# Patient Record
Sex: FEMALE | Race: WHITE | NOT HISPANIC OR LATINO | Employment: OTHER | URBAN - NONMETROPOLITAN AREA
[De-identification: names, ages, dates, MRNs, and addresses within clinical notes are randomized per-mention and may not be internally consistent; named-entity substitution may affect disease eponyms.]

---

## 2019-08-19 ENCOUNTER — HOSPITAL ENCOUNTER (OUTPATIENT)
Facility: HOSPITAL | Age: 44
Setting detail: OBSERVATION
Discharge: HOME | End: 2019-08-20
Attending: EMERGENCY MEDICINE | Admitting: INTERNAL MEDICINE

## 2019-08-19 ENCOUNTER — APPOINTMENT (OUTPATIENT)
Dept: GENERAL RADIOLOGY | Facility: HOSPITAL | Age: 44
End: 2019-08-19

## 2019-08-19 ENCOUNTER — APPOINTMENT (OUTPATIENT)
Dept: ULTRASOUND IMAGING | Facility: HOSPITAL | Age: 44
End: 2019-08-19

## 2019-08-19 ENCOUNTER — APPOINTMENT (OUTPATIENT)
Dept: CT IMAGING | Facility: HOSPITAL | Age: 44
End: 2019-08-19

## 2019-08-19 DIAGNOSIS — N17.9 AKI (ACUTE KIDNEY INJURY) (HCC): ICD-10-CM

## 2019-08-19 DIAGNOSIS — R07.9 CHEST PAIN, UNSPECIFIED TYPE: Primary | ICD-10-CM

## 2019-08-19 LAB
ALBUMIN SERPL-MCNC: 3.58 G/DL (ref 3.5–5.2)
ALBUMIN/GLOB SERPL: 0.9 G/DL
ALP SERPL-CCNC: 170 U/L (ref 39–117)
ALT SERPL W P-5'-P-CCNC: 19 U/L (ref 1–33)
ANION GAP SERPL CALCULATED.3IONS-SCNC: 13.8 MMOL/L (ref 5–15)
AST SERPL-CCNC: 18 U/L (ref 1–32)
BASOPHILS # BLD AUTO: 0.03 10*3/MM3 (ref 0–0.2)
BASOPHILS NFR BLD AUTO: 0.3 % (ref 0–1.5)
BILIRUB SERPL-MCNC: 0.3 MG/DL (ref 0.2–1.2)
BILIRUB UR QL STRIP: NEGATIVE
BUN BLD-MCNC: 9 MG/DL (ref 6–20)
BUN/CREAT SERPL: 8.3 (ref 7–25)
CALCIUM SPEC-SCNC: 8.2 MG/DL (ref 8.6–10.5)
CHLORIDE SERPL-SCNC: 97 MMOL/L (ref 98–107)
CLARITY UR: CLEAR
CO2 SERPL-SCNC: 25.2 MMOL/L (ref 22–29)
COLOR UR: YELLOW
CREAT BLD-MCNC: 1.09 MG/DL (ref 0.57–1)
D DIMER PPP FEU-MCNC: 0.78 MCGFEU/ML (ref 0–0.5)
DEPRECATED RDW RBC AUTO: 48.3 FL (ref 37–54)
EOSINOPHIL # BLD AUTO: 0.22 10*3/MM3 (ref 0–0.4)
EOSINOPHIL NFR BLD AUTO: 2.1 % (ref 0.3–6.2)
ERYTHROCYTE [DISTWIDTH] IN BLOOD BY AUTOMATED COUNT: 16.4 % (ref 12.3–15.4)
GFR SERPL CREATININE-BSD FRML MDRD: 55 ML/MIN/1.73
GLOBULIN UR ELPH-MCNC: 3.8 GM/DL
GLUCOSE BLD-MCNC: 110 MG/DL (ref 65–99)
GLUCOSE UR STRIP-MCNC: NEGATIVE MG/DL
HCT VFR BLD AUTO: 34.9 % (ref 34–46.6)
HGB BLD-MCNC: 10.8 G/DL (ref 12–15.9)
HGB UR QL STRIP.AUTO: NEGATIVE
HOLD SPECIMEN: NORMAL
HOLD SPECIMEN: NORMAL
IMM GRANULOCYTES # BLD AUTO: 0.14 10*3/MM3 (ref 0–0.05)
IMM GRANULOCYTES NFR BLD AUTO: 1.4 % (ref 0–0.5)
INR PPP: 0.95 (ref 0.9–1.1)
KETONES UR QL STRIP: NEGATIVE
LEUKOCYTE ESTERASE UR QL STRIP.AUTO: NEGATIVE
LYMPHOCYTES # BLD AUTO: 3.44 10*3/MM3 (ref 0.7–3.1)
LYMPHOCYTES NFR BLD AUTO: 33.4 % (ref 19.6–45.3)
MCH RBC QN AUTO: 25.6 PG (ref 26.6–33)
MCHC RBC AUTO-ENTMCNC: 30.9 G/DL (ref 31.5–35.7)
MCV RBC AUTO: 82.7 FL (ref 79–97)
MONOCYTES # BLD AUTO: 0.53 10*3/MM3 (ref 0.1–0.9)
MONOCYTES NFR BLD AUTO: 5.1 % (ref 5–12)
NEUTROPHILS # BLD AUTO: 5.95 10*3/MM3 (ref 1.7–7)
NEUTROPHILS NFR BLD AUTO: 57.7 % (ref 42.7–76)
NITRITE UR QL STRIP: NEGATIVE
NT-PROBNP SERPL-MCNC: 21.2 PG/ML (ref 5–450)
PH UR STRIP.AUTO: <=5 [PH] (ref 5–8)
PLATELET # BLD AUTO: 476 10*3/MM3 (ref 140–450)
PMV BLD AUTO: 10 FL (ref 6–12)
POTASSIUM BLD-SCNC: 3.1 MMOL/L (ref 3.5–5.2)
PROT SERPL-MCNC: 7.4 G/DL (ref 6–8.5)
PROT UR QL STRIP: NEGATIVE
PROTHROMBIN TIME: 13.2 SECONDS (ref 11–15.4)
RBC # BLD AUTO: 4.22 10*6/MM3 (ref 3.77–5.28)
SODIUM BLD-SCNC: 136 MMOL/L (ref 136–145)
SP GR UR STRIP: 1.01 (ref 1–1.03)
TROPONIN T SERPL-MCNC: <0.01 NG/ML (ref 0–0.03)
TSH SERPL DL<=0.05 MIU/L-ACNC: 3.55 MIU/ML (ref 0.27–4.2)
UROBILINOGEN UR QL STRIP: NORMAL
WBC NRBC COR # BLD: 10.31 10*3/MM3 (ref 3.4–10.8)
WHOLE BLOOD HOLD SPECIMEN: NORMAL
WHOLE BLOOD HOLD SPECIMEN: NORMAL

## 2019-08-19 PROCEDURE — G0378 HOSPITAL OBSERVATION PER HR: HCPCS

## 2019-08-19 PROCEDURE — 25010000002 HEPARIN (PORCINE) PER 1000 UNITS: Performed by: PHYSICIAN ASSISTANT

## 2019-08-19 PROCEDURE — 80053 COMPREHEN METABOLIC PANEL: CPT | Performed by: PHYSICIAN ASSISTANT

## 2019-08-19 PROCEDURE — 93010 ELECTROCARDIOGRAM REPORT: CPT | Performed by: INTERNAL MEDICINE

## 2019-08-19 PROCEDURE — 93970 EXTREMITY STUDY: CPT

## 2019-08-19 PROCEDURE — 93005 ELECTROCARDIOGRAM TRACING: CPT | Performed by: EMERGENCY MEDICINE

## 2019-08-19 PROCEDURE — 71275 CT ANGIOGRAPHY CHEST: CPT

## 2019-08-19 PROCEDURE — 84443 ASSAY THYROID STIM HORMONE: CPT | Performed by: PHYSICIAN ASSISTANT

## 2019-08-19 PROCEDURE — 84484 ASSAY OF TROPONIN QUANT: CPT | Performed by: PHYSICIAN ASSISTANT

## 2019-08-19 PROCEDURE — 71045 X-RAY EXAM CHEST 1 VIEW: CPT

## 2019-08-19 PROCEDURE — 71275 CT ANGIOGRAPHY CHEST: CPT | Performed by: RADIOLOGY

## 2019-08-19 PROCEDURE — 71045 X-RAY EXAM CHEST 1 VIEW: CPT | Performed by: RADIOLOGY

## 2019-08-19 PROCEDURE — 85025 COMPLETE CBC W/AUTO DIFF WBC: CPT | Performed by: PHYSICIAN ASSISTANT

## 2019-08-19 PROCEDURE — 81003 URINALYSIS AUTO W/O SCOPE: CPT | Performed by: PHYSICIAN ASSISTANT

## 2019-08-19 PROCEDURE — 93970 EXTREMITY STUDY: CPT | Performed by: RADIOLOGY

## 2019-08-19 PROCEDURE — 96372 THER/PROPH/DIAG INJ SC/IM: CPT

## 2019-08-19 PROCEDURE — 85379 FIBRIN DEGRADATION QUANT: CPT | Performed by: PHYSICIAN ASSISTANT

## 2019-08-19 PROCEDURE — 93005 ELECTROCARDIOGRAM TRACING: CPT | Performed by: PHYSICIAN ASSISTANT

## 2019-08-19 PROCEDURE — 99285 EMERGENCY DEPT VISIT HI MDM: CPT

## 2019-08-19 PROCEDURE — 0 IOVERSOL 74 % SOLUTION: Performed by: EMERGENCY MEDICINE

## 2019-08-19 PROCEDURE — 85610 PROTHROMBIN TIME: CPT | Performed by: PHYSICIAN ASSISTANT

## 2019-08-19 PROCEDURE — 83880 ASSAY OF NATRIURETIC PEPTIDE: CPT | Performed by: PHYSICIAN ASSISTANT

## 2019-08-19 RX ORDER — CLONIDINE HYDROCHLORIDE 0.1 MG/1
0.1 TABLET ORAL 2 TIMES DAILY
Status: ON HOLD | COMMUNITY
End: 2020-07-05

## 2019-08-19 RX ORDER — DOXEPIN HYDROCHLORIDE 10 MG/1
10 CAPSULE ORAL NIGHTLY
COMMUNITY
End: 2019-08-19

## 2019-08-19 RX ORDER — FUROSEMIDE 40 MG/1
40 TABLET ORAL DAILY
COMMUNITY
End: 2019-08-20 | Stop reason: HOSPADM

## 2019-08-19 RX ORDER — ACETAMINOPHEN 325 MG/1
650 TABLET ORAL EVERY 4 HOURS PRN
Status: DISCONTINUED | OUTPATIENT
Start: 2019-08-19 | End: 2019-08-20 | Stop reason: HOSPADM

## 2019-08-19 RX ORDER — ONDANSETRON 2 MG/ML
4 INJECTION INTRAMUSCULAR; INTRAVENOUS EVERY 6 HOURS PRN
Status: DISCONTINUED | OUTPATIENT
Start: 2019-08-19 | End: 2019-08-20 | Stop reason: HOSPADM

## 2019-08-19 RX ORDER — BUPROPION HYDROCHLORIDE 150 MG/1
150 TABLET, EXTENDED RELEASE ORAL 2 TIMES DAILY
Status: ON HOLD | COMMUNITY
End: 2020-07-05

## 2019-08-19 RX ORDER — NITROGLYCERIN 0.4 MG/1
0.4 TABLET SUBLINGUAL
Status: DISCONTINUED | OUTPATIENT
Start: 2019-08-19 | End: 2019-08-20 | Stop reason: HOSPADM

## 2019-08-19 RX ORDER — POTASSIUM CHLORIDE 20 MEQ/1
40 TABLET, EXTENDED RELEASE ORAL EVERY 4 HOURS
Status: COMPLETED | OUTPATIENT
Start: 2019-08-19 | End: 2019-08-20

## 2019-08-19 RX ORDER — POTASSIUM CHLORIDE 1.5 G/1.77G
40 POWDER, FOR SOLUTION ORAL AS NEEDED
Status: DISCONTINUED | OUTPATIENT
Start: 2019-08-19 | End: 2019-08-20 | Stop reason: HOSPADM

## 2019-08-19 RX ORDER — ACETAMINOPHEN 325 MG/1
650 TABLET ORAL ONCE
Status: COMPLETED | OUTPATIENT
Start: 2019-08-19 | End: 2019-08-19

## 2019-08-19 RX ORDER — HEPARIN SODIUM 5000 [USP'U]/ML
5000 INJECTION, SOLUTION INTRAVENOUS; SUBCUTANEOUS EVERY 12 HOURS SCHEDULED
Status: DISCONTINUED | OUTPATIENT
Start: 2019-08-19 | End: 2019-08-20 | Stop reason: HOSPADM

## 2019-08-19 RX ORDER — LOSARTAN POTASSIUM 50 MG/1
100 TABLET ORAL DAILY
Status: DISCONTINUED | OUTPATIENT
Start: 2019-08-20 | End: 2019-08-20 | Stop reason: HOSPADM

## 2019-08-19 RX ORDER — TIZANIDINE 4 MG/1
4 TABLET ORAL EVERY 8 HOURS PRN
Status: ON HOLD | COMMUNITY
End: 2020-07-05

## 2019-08-19 RX ORDER — FLUOXETINE HYDROCHLORIDE 20 MG/1
60 CAPSULE ORAL DAILY
Status: ON HOLD | COMMUNITY
End: 2020-07-05

## 2019-08-19 RX ORDER — BUPRENORPHINE HYDROCHLORIDE AND NALOXONE HYDROCHLORIDE DIHYDRATE 8; 2 MG/1; MG/1
2 TABLET SUBLINGUAL DAILY
Status: DISCONTINUED | OUTPATIENT
Start: 2019-08-20 | End: 2019-08-20 | Stop reason: HOSPADM

## 2019-08-19 RX ORDER — ZOLPIDEM TARTRATE 5 MG/1
5 TABLET ORAL NIGHTLY PRN
Status: DISCONTINUED | OUTPATIENT
Start: 2019-08-19 | End: 2019-08-20 | Stop reason: HOSPADM

## 2019-08-19 RX ORDER — PRAZOSIN HYDROCHLORIDE 5 MG/1
5 CAPSULE ORAL NIGHTLY
Status: ON HOLD | COMMUNITY
End: 2020-07-05

## 2019-08-19 RX ORDER — AMITRIPTYLINE HYDROCHLORIDE 25 MG/1
25 TABLET, FILM COATED ORAL NIGHTLY
COMMUNITY
End: 2019-08-19

## 2019-08-19 RX ORDER — PANTOPRAZOLE SODIUM 40 MG/1
40 TABLET, DELAYED RELEASE ORAL EVERY MORNING
Status: DISCONTINUED | OUTPATIENT
Start: 2019-08-20 | End: 2019-08-20 | Stop reason: HOSPADM

## 2019-08-19 RX ORDER — AMITRIPTYLINE HYDROCHLORIDE 75 MG/1
75 TABLET, FILM COATED ORAL NIGHTLY
Status: ON HOLD | COMMUNITY
End: 2020-07-05

## 2019-08-19 RX ORDER — SPIRONOLACTONE 25 MG/1
25 TABLET ORAL DAILY
COMMUNITY
End: 2019-08-20 | Stop reason: HOSPADM

## 2019-08-19 RX ORDER — ACETAMINOPHEN 650 MG/1
650 SUPPOSITORY RECTAL EVERY 4 HOURS PRN
Status: DISCONTINUED | OUTPATIENT
Start: 2019-08-19 | End: 2019-08-20 | Stop reason: HOSPADM

## 2019-08-19 RX ORDER — BUPRENORPHINE AND NALOXONE 8; 2 MG/1; MG/1
2 FILM, SOLUBLE BUCCAL; SUBLINGUAL DAILY
COMMUNITY

## 2019-08-19 RX ORDER — OMEPRAZOLE 20 MG/1
20 CAPSULE, DELAYED RELEASE ORAL DAILY
Status: ON HOLD | COMMUNITY
End: 2020-07-05

## 2019-08-19 RX ORDER — ACETAMINOPHEN 160 MG/5ML
650 SOLUTION ORAL EVERY 4 HOURS PRN
Status: DISCONTINUED | OUTPATIENT
Start: 2019-08-19 | End: 2019-08-20 | Stop reason: HOSPADM

## 2019-08-19 RX ORDER — FUROSEMIDE 40 MG/1
40 TABLET ORAL DAILY
Status: DISCONTINUED | OUTPATIENT
Start: 2019-08-20 | End: 2019-08-20

## 2019-08-19 RX ORDER — ROPINIROLE 2 MG/1
3 TABLET, FILM COATED ORAL 2 TIMES DAILY
COMMUNITY
End: 2019-08-19

## 2019-08-19 RX ORDER — ZOLPIDEM TARTRATE 10 MG/1
10 TABLET ORAL NIGHTLY PRN
COMMUNITY

## 2019-08-19 RX ORDER — IBUPROFEN 800 MG/1
800 TABLET ORAL EVERY 6 HOURS PRN
COMMUNITY
End: 2019-08-19

## 2019-08-19 RX ORDER — SODIUM CHLORIDE 0.9 % (FLUSH) 0.9 %
10 SYRINGE (ML) INJECTION AS NEEDED
Status: DISCONTINUED | OUTPATIENT
Start: 2019-08-19 | End: 2019-08-20 | Stop reason: HOSPADM

## 2019-08-19 RX ORDER — LOSARTAN POTASSIUM 100 MG/1
100 TABLET ORAL DAILY
Status: ON HOLD | COMMUNITY
End: 2020-07-05

## 2019-08-19 RX ORDER — POTASSIUM CHLORIDE 20 MEQ/1
40 TABLET, EXTENDED RELEASE ORAL ONCE
Status: COMPLETED | OUTPATIENT
Start: 2019-08-19 | End: 2019-08-19

## 2019-08-19 RX ORDER — FLUOXETINE HYDROCHLORIDE 20 MG/1
60 CAPSULE ORAL DAILY
Status: DISCONTINUED | OUTPATIENT
Start: 2019-08-20 | End: 2019-08-20 | Stop reason: HOSPADM

## 2019-08-19 RX ORDER — ONDANSETRON 4 MG/1
4 TABLET, FILM COATED ORAL EVERY 6 HOURS PRN
Status: DISCONTINUED | OUTPATIENT
Start: 2019-08-19 | End: 2019-08-20 | Stop reason: HOSPADM

## 2019-08-19 RX ORDER — SODIUM CHLORIDE 0.9 % (FLUSH) 0.9 %
3-10 SYRINGE (ML) INJECTION AS NEEDED
Status: DISCONTINUED | OUTPATIENT
Start: 2019-08-19 | End: 2019-08-20 | Stop reason: HOSPADM

## 2019-08-19 RX ORDER — ASPIRIN 81 MG/1
324 TABLET, CHEWABLE ORAL ONCE
Status: COMPLETED | OUTPATIENT
Start: 2019-08-19 | End: 2019-08-19

## 2019-08-19 RX ORDER — CHLORTHALIDONE 25 MG/1
25 TABLET ORAL DAILY
Status: ON HOLD | COMMUNITY
End: 2020-07-05

## 2019-08-19 RX ORDER — CHLORTHALIDONE 50 MG/1
25 TABLET ORAL DAILY
Status: DISCONTINUED | OUTPATIENT
Start: 2019-08-20 | End: 2019-08-20 | Stop reason: HOSPADM

## 2019-08-19 RX ORDER — TIZANIDINE 4 MG/1
4 TABLET ORAL EVERY 8 HOURS PRN
Status: DISCONTINUED | OUTPATIENT
Start: 2019-08-19 | End: 2019-08-20 | Stop reason: HOSPADM

## 2019-08-19 RX ORDER — ROPINIROLE 1 MG/1
3 TABLET, FILM COATED ORAL 2 TIMES DAILY PRN
Status: DISCONTINUED | OUTPATIENT
Start: 2019-08-19 | End: 2019-08-20 | Stop reason: HOSPADM

## 2019-08-19 RX ORDER — POTASSIUM CHLORIDE 20 MEQ/1
40 TABLET, EXTENDED RELEASE ORAL AS NEEDED
Status: DISCONTINUED | OUTPATIENT
Start: 2019-08-19 | End: 2019-08-20 | Stop reason: HOSPADM

## 2019-08-19 RX ORDER — POTASSIUM CHLORIDE 7.45 MG/ML
10 INJECTION INTRAVENOUS
Status: DISCONTINUED | OUTPATIENT
Start: 2019-08-19 | End: 2019-08-20 | Stop reason: HOSPADM

## 2019-08-19 RX ORDER — BUPRENORPHINE HYDROCHLORIDE AND NALOXONE HYDROCHLORIDE DIHYDRATE 8; 2 MG/1; MG/1
1 TABLET SUBLINGUAL DAILY
COMMUNITY
End: 2019-08-19

## 2019-08-19 RX ORDER — LOSARTAN POTASSIUM 50 MG/1
100 TABLET ORAL DAILY
COMMUNITY
End: 2019-08-19

## 2019-08-19 RX ORDER — ROPINIROLE 3 MG/1
3 TABLET, FILM COATED ORAL 3 TIMES DAILY
COMMUNITY

## 2019-08-19 RX ORDER — SODIUM CHLORIDE 0.9 % (FLUSH) 0.9 %
3 SYRINGE (ML) INJECTION EVERY 12 HOURS SCHEDULED
Status: DISCONTINUED | OUTPATIENT
Start: 2019-08-19 | End: 2019-08-20 | Stop reason: HOSPADM

## 2019-08-19 RX ORDER — SPIRONOLACTONE 25 MG/1
25 TABLET ORAL DAILY
Status: DISCONTINUED | OUTPATIENT
Start: 2019-08-20 | End: 2019-08-20 | Stop reason: HOSPADM

## 2019-08-19 RX ORDER — BUPROPION HYDROCHLORIDE 150 MG/1
150 TABLET, EXTENDED RELEASE ORAL 2 TIMES DAILY
COMMUNITY
End: 2019-08-19

## 2019-08-19 RX ORDER — CLONIDINE HYDROCHLORIDE 0.1 MG/1
0.1 TABLET ORAL 2 TIMES DAILY
Status: DISCONTINUED | OUTPATIENT
Start: 2019-08-19 | End: 2019-08-20 | Stop reason: HOSPADM

## 2019-08-19 RX ORDER — PRAZOSIN HYDROCHLORIDE 5 MG/1
5 CAPSULE ORAL NIGHTLY
Status: DISCONTINUED | OUTPATIENT
Start: 2019-08-19 | End: 2019-08-20 | Stop reason: HOSPADM

## 2019-08-19 RX ORDER — BUPROPION HYDROCHLORIDE 150 MG/1
150 TABLET, EXTENDED RELEASE ORAL 2 TIMES DAILY
Status: DISCONTINUED | OUTPATIENT
Start: 2019-08-19 | End: 2019-08-20 | Stop reason: HOSPADM

## 2019-08-19 RX ADMIN — IOVERSOL 100 ML: 741 INJECTION INTRA-ARTERIAL; INTRAVENOUS at 17:16

## 2019-08-19 RX ADMIN — ROPINIROLE HYDROCHLORIDE 3 MG: 1 TABLET, FILM COATED ORAL at 23:48

## 2019-08-19 RX ADMIN — AMITRIPTYLINE HYDROCHLORIDE 75 MG: 50 TABLET, FILM COATED ORAL at 22:08

## 2019-08-19 RX ADMIN — ACETAMINOPHEN 650 MG: 325 TABLET ORAL at 15:28

## 2019-08-19 RX ADMIN — NITROGLYCERIN 1 INCH: 20 OINTMENT TOPICAL at 12:45

## 2019-08-19 RX ADMIN — PRAZOSIN HYDROCHLORIDE 5 MG: 5 CAPSULE ORAL at 22:08

## 2019-08-19 RX ADMIN — TIZANIDINE 4 MG: 4 TABLET ORAL at 23:48

## 2019-08-19 RX ADMIN — ASPIRIN 324 MG: 81 TABLET, CHEWABLE ORAL at 12:44

## 2019-08-19 RX ADMIN — ZOLPIDEM TARTRATE 5 MG: 5 TABLET ORAL at 22:11

## 2019-08-19 RX ADMIN — POTASSIUM CHLORIDE 40 MEQ: 1500 TABLET, EXTENDED RELEASE ORAL at 22:07

## 2019-08-19 RX ADMIN — HEPARIN SODIUM 5000 UNITS: 5000 INJECTION INTRAVENOUS; SUBCUTANEOUS at 23:48

## 2019-08-19 RX ADMIN — POTASSIUM CHLORIDE 40 MEQ: 1500 TABLET, EXTENDED RELEASE ORAL at 15:30

## 2019-08-20 ENCOUNTER — APPOINTMENT (OUTPATIENT)
Dept: CARDIOLOGY | Facility: HOSPITAL | Age: 44
End: 2019-08-20

## 2019-08-20 ENCOUNTER — APPOINTMENT (OUTPATIENT)
Dept: NUCLEAR MEDICINE | Facility: HOSPITAL | Age: 44
End: 2019-08-20

## 2019-08-20 VITALS
OXYGEN SATURATION: 93 % | HEART RATE: 91 BPM | SYSTOLIC BLOOD PRESSURE: 129 MMHG | WEIGHT: 293 LBS | TEMPERATURE: 98 F | HEIGHT: 63 IN | DIASTOLIC BLOOD PRESSURE: 85 MMHG | RESPIRATION RATE: 18 BRPM | BODY MASS INDEX: 51.91 KG/M2

## 2019-08-20 PROBLEM — R07.9 CHEST PAIN: Status: RESOLVED | Noted: 2019-08-19 | Resolved: 2019-08-20

## 2019-08-20 LAB
ALBUMIN SERPL-MCNC: 3.22 G/DL (ref 3.5–5.2)
ALBUMIN/GLOB SERPL: 1 G/DL
ALP SERPL-CCNC: 161 U/L (ref 39–117)
ALT SERPL W P-5'-P-CCNC: 16 U/L (ref 1–33)
ANION GAP SERPL CALCULATED.3IONS-SCNC: 12.9 MMOL/L (ref 5–15)
ANISOCYTOSIS BLD QL: NORMAL
AST SERPL-CCNC: 17 U/L (ref 1–32)
BH CV NUCLEAR PRIOR STUDY: 3
BH CV STRESS BP STAGE 1: NORMAL
BH CV STRESS BP STAGE 2: NORMAL
BH CV STRESS COMMENTS STAGE 1: NORMAL
BH CV STRESS COMMENTS STAGE 2: NORMAL
BH CV STRESS DOSE REGADENOSON STAGE 1: 0.4
BH CV STRESS DURATION MIN STAGE 1: 0
BH CV STRESS DURATION MIN STAGE 2: 4
BH CV STRESS DURATION SEC STAGE 1: 10
BH CV STRESS DURATION SEC STAGE 2: 0
BH CV STRESS HR STAGE 1: 98
BH CV STRESS HR STAGE 2: 97
BH CV STRESS PROTOCOL 1: NORMAL
BH CV STRESS RECOVERY BP: NORMAL MMHG
BH CV STRESS RECOVERY HR: 94 BPM
BH CV STRESS STAGE 1: 1
BH CV STRESS STAGE 2: 2
BILIRUB SERPL-MCNC: 0.3 MG/DL (ref 0.2–1.2)
BUN BLD-MCNC: 13 MG/DL (ref 6–20)
BUN/CREAT SERPL: 9.9 (ref 7–25)
CALCIUM SPEC-SCNC: 7.8 MG/DL (ref 8.6–10.5)
CHLORIDE SERPL-SCNC: 96 MMOL/L (ref 98–107)
CHOLEST SERPL-MCNC: 198 MG/DL (ref 0–200)
CO2 SERPL-SCNC: 25.1 MMOL/L (ref 22–29)
CREAT BLD-MCNC: 1.31 MG/DL (ref 0.57–1)
DEPRECATED RDW RBC AUTO: 51.3 FL (ref 37–54)
EOSINOPHIL # BLD MANUAL: 0.09 10*3/MM3 (ref 0–0.4)
EOSINOPHIL NFR BLD MANUAL: 1 % (ref 0.3–6.2)
ERYTHROCYTE [DISTWIDTH] IN BLOOD BY AUTOMATED COUNT: 16.5 % (ref 12.3–15.4)
GFR SERPL CREATININE-BSD FRML MDRD: 44 ML/MIN/1.73
GLOBULIN UR ELPH-MCNC: 3.4 GM/DL
GLUCOSE BLD-MCNC: 211 MG/DL (ref 65–99)
HCT VFR BLD AUTO: 34.5 % (ref 34–46.6)
HDLC SERPL-MCNC: 33 MG/DL (ref 40–60)
HGB BLD-MCNC: 10.2 G/DL (ref 12–15.9)
HYPOCHROMIA BLD QL: NORMAL
LDLC SERPL CALC-MCNC: 103 MG/DL (ref 0–100)
LDLC/HDLC SERPL: 3.12 {RATIO}
LIPASE SERPL-CCNC: 8 U/L (ref 13–60)
LV EF NUC BP: 57 %
LYMPHOCYTES # BLD MANUAL: 2.02 10*3/MM3 (ref 0.7–3.1)
LYMPHOCYTES NFR BLD MANUAL: 22 % (ref 19.6–45.3)
LYMPHOCYTES NFR BLD MANUAL: 8 % (ref 5–12)
MAXIMAL PREDICTED HEART RATE: 177 BPM
MCH RBC QN AUTO: 25.1 PG (ref 26.6–33)
MCHC RBC AUTO-ENTMCNC: 29.6 G/DL (ref 31.5–35.7)
MCV RBC AUTO: 84.8 FL (ref 79–97)
MONOCYTES # BLD AUTO: 0.74 10*3/MM3 (ref 0.1–0.9)
NEUTROPHILS # BLD AUTO: 6.35 10*3/MM3 (ref 1.7–7)
NEUTROPHILS NFR BLD MANUAL: 69 % (ref 42.7–76)
NT-PROBNP SERPL-MCNC: <5 PG/ML (ref 5–450)
PERCENT MAX PREDICTED HR: 55.37 %
PLAT MORPH BLD: NORMAL
PLATELET # BLD AUTO: 357 10*3/MM3 (ref 140–450)
PMV BLD AUTO: 9.6 FL (ref 6–12)
POTASSIUM BLD-SCNC: 3.4 MMOL/L (ref 3.5–5.2)
POTASSIUM BLD-SCNC: 3.8 MMOL/L (ref 3.5–5.2)
PROT SERPL-MCNC: 6.6 G/DL (ref 6–8.5)
RBC # BLD AUTO: 4.07 10*6/MM3 (ref 3.77–5.28)
SODIUM BLD-SCNC: 134 MMOL/L (ref 136–145)
STRESS BASELINE BP: NORMAL MMHG
STRESS BASELINE HR: 85 BPM
STRESS PERCENT HR: 65 %
STRESS POST PEAK BP: NORMAL MMHG
STRESS POST PEAK HR: 98 BPM
STRESS TARGET HR: 150 BPM
TRIGL SERPL-MCNC: 310 MG/DL (ref 0–150)
TROPONIN T SERPL-MCNC: <0.01 NG/ML (ref 0–0.03)
VLDLC SERPL-MCNC: 62 MG/DL
WBC NRBC COR # BLD: 9.2 10*3/MM3 (ref 3.4–10.8)

## 2019-08-20 PROCEDURE — G0378 HOSPITAL OBSERVATION PER HR: HCPCS

## 2019-08-20 PROCEDURE — 85007 BL SMEAR W/DIFF WBC COUNT: CPT | Performed by: PHYSICIAN ASSISTANT

## 2019-08-20 PROCEDURE — 93010 ELECTROCARDIOGRAM REPORT: CPT | Performed by: INTERNAL MEDICINE

## 2019-08-20 PROCEDURE — 84132 ASSAY OF SERUM POTASSIUM: CPT | Performed by: INTERNAL MEDICINE

## 2019-08-20 PROCEDURE — 93005 ELECTROCARDIOGRAM TRACING: CPT | Performed by: INTERNAL MEDICINE

## 2019-08-20 PROCEDURE — 96360 HYDRATION IV INFUSION INIT: CPT

## 2019-08-20 PROCEDURE — 96372 THER/PROPH/DIAG INJ SC/IM: CPT

## 2019-08-20 PROCEDURE — 78452 HT MUSCLE IMAGE SPECT MULT: CPT

## 2019-08-20 PROCEDURE — A9500 TC99M SESTAMIBI: HCPCS | Performed by: INTERNAL MEDICINE

## 2019-08-20 PROCEDURE — G0108 DIAB MANAGE TRN  PER INDIV: HCPCS

## 2019-08-20 PROCEDURE — 83880 ASSAY OF NATRIURETIC PEPTIDE: CPT | Performed by: PHYSICIAN ASSISTANT

## 2019-08-20 PROCEDURE — 83690 ASSAY OF LIPASE: CPT | Performed by: PHYSICIAN ASSISTANT

## 2019-08-20 PROCEDURE — 93005 ELECTROCARDIOGRAM TRACING: CPT | Performed by: PHYSICIAN ASSISTANT

## 2019-08-20 PROCEDURE — 93018 CV STRESS TEST I&R ONLY: CPT | Performed by: INTERNAL MEDICINE

## 2019-08-20 PROCEDURE — 0 TECHNETIUM SESTAMIBI: Performed by: INTERNAL MEDICINE

## 2019-08-20 PROCEDURE — 93017 CV STRESS TEST TRACING ONLY: CPT

## 2019-08-20 PROCEDURE — 85027 COMPLETE CBC AUTOMATED: CPT | Performed by: PHYSICIAN ASSISTANT

## 2019-08-20 PROCEDURE — 78452 HT MUSCLE IMAGE SPECT MULT: CPT | Performed by: INTERNAL MEDICINE

## 2019-08-20 PROCEDURE — 99204 OFFICE O/P NEW MOD 45 MIN: CPT | Performed by: INTERNAL MEDICINE

## 2019-08-20 PROCEDURE — 80061 LIPID PANEL: CPT | Performed by: PHYSICIAN ASSISTANT

## 2019-08-20 PROCEDURE — 25010000002 REGADENOSON 0.4 MG/5ML SOLUTION: Performed by: INTERNAL MEDICINE

## 2019-08-20 PROCEDURE — 80053 COMPREHEN METABOLIC PANEL: CPT | Performed by: PHYSICIAN ASSISTANT

## 2019-08-20 PROCEDURE — 96361 HYDRATE IV INFUSION ADD-ON: CPT

## 2019-08-20 PROCEDURE — 25010000002 HEPARIN (PORCINE) PER 1000 UNITS: Performed by: PHYSICIAN ASSISTANT

## 2019-08-20 PROCEDURE — 84484 ASSAY OF TROPONIN QUANT: CPT | Performed by: PHYSICIAN ASSISTANT

## 2019-08-20 RX ORDER — ATORVASTATIN CALCIUM 40 MG/1
40 TABLET, FILM COATED ORAL NIGHTLY
Status: DISCONTINUED | OUTPATIENT
Start: 2019-08-20 | End: 2019-08-20 | Stop reason: HOSPADM

## 2019-08-20 RX ORDER — SODIUM CHLORIDE 9 MG/ML
100 INJECTION, SOLUTION INTRAVENOUS CONTINUOUS
Status: DISCONTINUED | OUTPATIENT
Start: 2019-08-20 | End: 2019-08-20 | Stop reason: HOSPADM

## 2019-08-20 RX ORDER — ASPIRIN 81 MG/1
81 TABLET ORAL DAILY
Status: DISCONTINUED | OUTPATIENT
Start: 2019-08-20 | End: 2019-08-20 | Stop reason: HOSPADM

## 2019-08-20 RX ORDER — ASPIRIN 81 MG/1
81 TABLET ORAL DAILY
Qty: 30 TABLET | Refills: 0 | Status: ON HOLD | OUTPATIENT
Start: 2019-08-21 | End: 2020-07-05

## 2019-08-20 RX ORDER — FLUTICASONE PROPIONATE 50 MCG
2 SPRAY, SUSPENSION (ML) NASAL DAILY
Status: DISCONTINUED | OUTPATIENT
Start: 2019-08-20 | End: 2019-08-20 | Stop reason: HOSPADM

## 2019-08-20 RX ORDER — CETIRIZINE HYDROCHLORIDE 10 MG/1
10 TABLET ORAL DAILY
Status: DISCONTINUED | OUTPATIENT
Start: 2019-08-20 | End: 2019-08-20 | Stop reason: HOSPADM

## 2019-08-20 RX ORDER — ATORVASTATIN CALCIUM 20 MG/1
20 TABLET, FILM COATED ORAL NIGHTLY
Status: DISCONTINUED | OUTPATIENT
Start: 2019-08-20 | End: 2019-08-20

## 2019-08-20 RX ORDER — ATORVASTATIN CALCIUM 40 MG/1
40 TABLET, FILM COATED ORAL NIGHTLY
Qty: 30 TABLET | Refills: 0 | Status: ON HOLD | OUTPATIENT
Start: 2019-08-20 | End: 2020-07-05

## 2019-08-20 RX ADMIN — ASPIRIN 81 MG: 81 TABLET, COATED ORAL at 13:31

## 2019-08-20 RX ADMIN — FUROSEMIDE 40 MG: 40 TABLET ORAL at 08:16

## 2019-08-20 RX ADMIN — LOSARTAN POTASSIUM 100 MG: 50 TABLET, FILM COATED ORAL at 08:15

## 2019-08-20 RX ADMIN — POTASSIUM CHLORIDE 40 MEQ: 1500 TABLET, EXTENDED RELEASE ORAL at 04:53

## 2019-08-20 RX ADMIN — ACETAMINOPHEN 650 MG: 325 TABLET ORAL at 11:06

## 2019-08-20 RX ADMIN — SODIUM CHLORIDE, PRESERVATIVE FREE 3 ML: 5 INJECTION INTRAVENOUS at 08:17

## 2019-08-20 RX ADMIN — TECHNETIUM TC 99M SESTAMIBI 1 DOSE: 1 INJECTION INTRAVENOUS at 14:46

## 2019-08-20 RX ADMIN — REGADENOSON 0.4 MG: 0.08 INJECTION, SOLUTION INTRAVENOUS at 14:46

## 2019-08-20 RX ADMIN — FLUTICASONE PROPIONATE 2 SPRAY: 50 SPRAY, METERED NASAL at 10:06

## 2019-08-20 RX ADMIN — SODIUM CHLORIDE 100 ML/HR: 9 INJECTION, SOLUTION INTRAVENOUS at 10:57

## 2019-08-20 RX ADMIN — CLONIDINE HYDROCHLORIDE 0.1 MG: 0.1 TABLET ORAL at 08:15

## 2019-08-20 RX ADMIN — CHLORTHALIDONE 25 MG: 50 TABLET ORAL at 08:16

## 2019-08-20 RX ADMIN — CETIRIZINE HYDROCHLORIDE 10 MG: 10 TABLET, FILM COATED ORAL at 10:06

## 2019-08-20 RX ADMIN — SPIRONOLACTONE 25 MG: 25 TABLET ORAL at 08:16

## 2019-08-20 RX ADMIN — BUPRENORPHINE AND NALOXONE 2 TABLET: 8; 2 TABLET SUBLINGUAL at 08:16

## 2019-08-20 RX ADMIN — FLUOXETINE 60 MG: 20 CAPSULE ORAL at 08:15

## 2019-08-20 RX ADMIN — TECHNETIUM TC 99M SESTAMIBI 1 DOSE: 1 INJECTION INTRAVENOUS at 11:55

## 2019-08-20 RX ADMIN — HEPARIN SODIUM 5000 UNITS: 5000 INJECTION INTRAVENOUS; SUBCUTANEOUS at 08:16

## 2019-08-20 RX ADMIN — POTASSIUM CHLORIDE 40 MEQ: 1500 TABLET, EXTENDED RELEASE ORAL at 02:02

## 2020-07-04 ENCOUNTER — APPOINTMENT (OUTPATIENT)
Dept: ULTRASOUND IMAGING | Facility: HOSPITAL | Age: 45
End: 2020-07-04

## 2020-07-04 ENCOUNTER — HOSPITAL ENCOUNTER (INPATIENT)
Facility: HOSPITAL | Age: 45
LOS: 1 days | Discharge: HOME | End: 2020-07-05
Attending: EMERGENCY MEDICINE | Admitting: INTERNAL MEDICINE

## 2020-07-04 ENCOUNTER — APPOINTMENT (OUTPATIENT)
Dept: GENERAL RADIOLOGY | Facility: HOSPITAL | Age: 45
End: 2020-07-04

## 2020-07-04 ENCOUNTER — APPOINTMENT (OUTPATIENT)
Dept: CT IMAGING | Facility: HOSPITAL | Age: 45
End: 2020-07-04

## 2020-07-04 DIAGNOSIS — L03.119 CELLULITIS OF LOWER EXTREMITY, UNSPECIFIED LATERALITY: Primary | ICD-10-CM

## 2020-07-04 PROBLEM — K21.9 GERD (GASTROESOPHAGEAL REFLUX DISEASE): Chronic | Status: ACTIVE | Noted: 2020-07-04

## 2020-07-04 PROBLEM — F41.9 ANXIETY DISORDER: Chronic | Status: ACTIVE | Noted: 2020-07-04

## 2020-07-04 PROBLEM — G93.2 PSEUDOTUMOR CEREBRI: Chronic | Status: ACTIVE | Noted: 2020-07-04

## 2020-07-04 PROBLEM — R60.9 EDEMA: Status: ACTIVE | Noted: 2020-07-04

## 2020-07-04 PROBLEM — I10 ESSENTIAL HYPERTENSION: Chronic | Status: ACTIVE | Noted: 2020-07-04

## 2020-07-04 PROBLEM — G25.81 RESTLESS LEG: Chronic | Status: ACTIVE | Noted: 2020-07-04

## 2020-07-04 PROBLEM — I50.9 CHF (CONGESTIVE HEART FAILURE) (HCC): Chronic | Status: ACTIVE | Noted: 2020-07-04

## 2020-07-04 PROBLEM — E78.5 HYPERLIPIDEMIA: Chronic | Status: ACTIVE | Noted: 2020-07-04

## 2020-07-04 PROBLEM — E66.01 MORBID OBESITY: Chronic | Status: ACTIVE | Noted: 2020-07-04

## 2020-07-04 LAB
A-A DO2: 63.2 MMHG (ref 0–300)
ALBUMIN SERPL-MCNC: 4.07 G/DL (ref 3.5–5.2)
ALBUMIN/GLOB SERPL: 1.1 G/DL
ALP SERPL-CCNC: 175 U/L (ref 39–117)
ALT SERPL W P-5'-P-CCNC: 18 U/L (ref 1–33)
AMYLASE SERPL-CCNC: 20 U/L (ref 28–100)
ANION GAP SERPL CALCULATED.3IONS-SCNC: 16.3 MMOL/L (ref 5–15)
ARTERIAL PATENCY WRIST A: POSITIVE
AST SERPL-CCNC: 15 U/L (ref 1–32)
ATMOSPHERIC PRESS: 726 MMHG
BASE EXCESS BLDA CALC-SCNC: 11.5 MMOL/L (ref 0–2)
BASOPHILS # BLD AUTO: 0.09 10*3/MM3 (ref 0–0.2)
BASOPHILS NFR BLD AUTO: 0.4 % (ref 0–1.5)
BDY SITE: ABNORMAL
BILIRUB SERPL-MCNC: 0.3 MG/DL (ref 0.2–1.2)
BILIRUB UR QL STRIP: NEGATIVE
BODY TEMPERATURE: 0 C
BUN SERPL-MCNC: 12 MG/DL (ref 6–20)
BUN/CREAT SERPL: 14.1 (ref 7–25)
CALCIUM SPEC-SCNC: 9 MG/DL (ref 8.6–10.5)
CHLORIDE SERPL-SCNC: 92 MMOL/L (ref 98–107)
CLARITY UR: CLEAR
CO2 BLDA-SCNC: 38.5 MMOL/L (ref 22–33)
CO2 SERPL-SCNC: 34.7 MMOL/L (ref 22–29)
COHGB MFR BLD: 1.7 % (ref 0–5)
COLOR UR: YELLOW
CREAT SERPL-MCNC: 0.85 MG/DL (ref 0.57–1)
CRP SERPL-MCNC: 1.22 MG/DL (ref 0–0.5)
D DIMER PPP FEU-MCNC: 0.42 MCGFEU/ML (ref 0–0.5)
D-LACTATE SERPL-SCNC: 1.9 MMOL/L (ref 0.5–2)
D-LACTATE SERPL-SCNC: 2.1 MMOL/L (ref 0.5–2)
DEPRECATED RDW RBC AUTO: 45.6 FL (ref 37–54)
EOSINOPHIL # BLD AUTO: 0.22 10*3/MM3 (ref 0–0.4)
EOSINOPHIL NFR BLD AUTO: 1.1 % (ref 0.3–6.2)
ERYTHROCYTE [DISTWIDTH] IN BLOOD BY AUTOMATED COUNT: 15 % (ref 12.3–15.4)
ERYTHROCYTE [SEDIMENTATION RATE] IN BLOOD: 25 MM/HR (ref 0–20)
GAS FLOW AIRWAY: 2 LPM
GFR SERPL CREATININE-BSD FRML MDRD: 73 ML/MIN/1.73
GLOBULIN UR ELPH-MCNC: 3.7 GM/DL
GLUCOSE SERPL-MCNC: 102 MG/DL (ref 65–99)
GLUCOSE UR STRIP-MCNC: NEGATIVE MG/DL
HCO3 BLDA-SCNC: 36.9 MMOL/L (ref 20–26)
HCT VFR BLD AUTO: 38.7 % (ref 34–46.6)
HCT VFR BLD CALC: 36.5 % (ref 38–51)
HGB BLD-MCNC: 11.8 G/DL (ref 12–15.9)
HGB BLDA-MCNC: 11.9 G/DL (ref 13.5–17.5)
HGB UR QL STRIP.AUTO: NEGATIVE
HOLD SPECIMEN: NORMAL
HOLD SPECIMEN: NORMAL
IMM GRANULOCYTES # BLD AUTO: 0.28 10*3/MM3 (ref 0–0.05)
IMM GRANULOCYTES NFR BLD AUTO: 1.4 % (ref 0–0.5)
INHALED O2 CONCENTRATION: 28 %
KETONES UR QL STRIP: NEGATIVE
LACTATE HOLD SPECIMEN: NORMAL
LEUKOCYTE ESTERASE UR QL STRIP.AUTO: NEGATIVE
LIPASE SERPL-CCNC: 9 U/L (ref 13–60)
LYMPHOCYTES # BLD AUTO: 5.21 10*3/MM3 (ref 0.7–3.1)
LYMPHOCYTES NFR BLD AUTO: 25.5 % (ref 19.6–45.3)
Lab: ABNORMAL
MCH RBC QN AUTO: 25.3 PG (ref 26.6–33)
MCHC RBC AUTO-ENTMCNC: 30.5 G/DL (ref 31.5–35.7)
MCV RBC AUTO: 82.9 FL (ref 79–97)
METHGB BLD QL: 0.4 % (ref 0–3)
MODALITY: ABNORMAL
MONOCYTES # BLD AUTO: 0.65 10*3/MM3 (ref 0.1–0.9)
MONOCYTES NFR BLD AUTO: 3.2 % (ref 5–12)
NEUTROPHILS NFR BLD AUTO: 14 10*3/MM3 (ref 1.7–7)
NEUTROPHILS NFR BLD AUTO: 68.4 % (ref 42.7–76)
NITRITE UR QL STRIP: NEGATIVE
NOTE: ABNORMAL
NRBC BLD AUTO-RTO: 0 /100 WBC (ref 0–0.2)
NT-PROBNP SERPL-MCNC: 94.9 PG/ML (ref 5–450)
OXYHGB MFR BLDV: 92.6 % (ref 94–99)
PCO2 BLDA: 51 MM HG (ref 35–45)
PCO2 TEMP ADJ BLD: ABNORMAL MM[HG]
PH BLDA: 7.47 PH UNITS (ref 7.35–7.45)
PH UR STRIP.AUTO: 6.5 [PH] (ref 5–8)
PH, TEMP CORRECTED: ABNORMAL
PLATELET # BLD AUTO: 421 10*3/MM3 (ref 140–450)
PMV BLD AUTO: 10 FL (ref 6–12)
PO2 BLDA: 70 MM HG (ref 83–108)
PO2 TEMP ADJ BLD: ABNORMAL MM[HG]
POTASSIUM SERPL-SCNC: 3.3 MMOL/L (ref 3.5–5.2)
PROT SERPL-MCNC: 7.8 G/DL (ref 6–8.5)
PROT UR QL STRIP: NEGATIVE
RBC # BLD AUTO: 4.67 10*6/MM3 (ref 3.77–5.28)
SAO2 % BLDCOA: 94.6 % (ref 94–99)
SODIUM SERPL-SCNC: 143 MMOL/L (ref 136–145)
SP GR UR STRIP: 1.01 (ref 1–1.03)
TROPONIN T SERPL-MCNC: <0.01 NG/ML (ref 0–0.03)
TROPONIN T SERPL-MCNC: <0.01 NG/ML (ref 0–0.03)
UROBILINOGEN UR QL STRIP: NORMAL
VENTILATOR MODE: ABNORMAL
WBC # BLD AUTO: 20.45 10*3/MM3 (ref 3.4–10.8)
WHOLE BLOOD HOLD SPECIMEN: NORMAL
WHOLE BLOOD HOLD SPECIMEN: NORMAL

## 2020-07-04 PROCEDURE — 82375 ASSAY CARBOXYHB QUANT: CPT

## 2020-07-04 PROCEDURE — 85025 COMPLETE CBC W/AUTO DIFF WBC: CPT | Performed by: NURSE PRACTITIONER

## 2020-07-04 PROCEDURE — 93010 ELECTROCARDIOGRAM REPORT: CPT | Performed by: INTERNAL MEDICINE

## 2020-07-04 PROCEDURE — 36600 WITHDRAWAL OF ARTERIAL BLOOD: CPT

## 2020-07-04 PROCEDURE — 84484 ASSAY OF TROPONIN QUANT: CPT | Performed by: NURSE PRACTITIONER

## 2020-07-04 PROCEDURE — 81003 URINALYSIS AUTO W/O SCOPE: CPT | Performed by: FAMILY MEDICINE

## 2020-07-04 PROCEDURE — 99284 EMERGENCY DEPT VISIT MOD MDM: CPT

## 2020-07-04 PROCEDURE — 25010000002 LORAZEPAM PER 2 MG: Performed by: NURSE PRACTITIONER

## 2020-07-04 PROCEDURE — 83036 HEMOGLOBIN GLYCOSYLATED A1C: CPT | Performed by: PHYSICIAN ASSISTANT

## 2020-07-04 PROCEDURE — 93005 ELECTROCARDIOGRAM TRACING: CPT | Performed by: FAMILY MEDICINE

## 2020-07-04 PROCEDURE — 83605 ASSAY OF LACTIC ACID: CPT | Performed by: FAMILY MEDICINE

## 2020-07-04 PROCEDURE — 86140 C-REACTIVE PROTEIN: CPT | Performed by: NURSE PRACTITIONER

## 2020-07-04 PROCEDURE — 74176 CT ABD & PELVIS W/O CONTRAST: CPT

## 2020-07-04 PROCEDURE — 25010000002 PROCHLORPERAZINE 10 MG/2ML SOLUTION: Performed by: NURSE PRACTITIONER

## 2020-07-04 PROCEDURE — 25010000002 MORPHINE PER 10 MG: Performed by: NURSE PRACTITIONER

## 2020-07-04 PROCEDURE — 82805 BLOOD GASES W/O2 SATURATION: CPT

## 2020-07-04 PROCEDURE — 71250 CT THORAX DX C-: CPT

## 2020-07-04 PROCEDURE — 25010000002 PIPERACILLIN SOD-TAZOBACTAM PER 1 G: Performed by: NURSE PRACTITIONER

## 2020-07-04 PROCEDURE — 85652 RBC SED RATE AUTOMATED: CPT | Performed by: NURSE PRACTITIONER

## 2020-07-04 PROCEDURE — 25010000002 KETOROLAC TROMETHAMINE PER 15 MG: Performed by: NURSE PRACTITIONER

## 2020-07-04 PROCEDURE — 71045 X-RAY EXAM CHEST 1 VIEW: CPT

## 2020-07-04 PROCEDURE — 87040 BLOOD CULTURE FOR BACTERIA: CPT | Performed by: FAMILY MEDICINE

## 2020-07-04 PROCEDURE — 83880 ASSAY OF NATRIURETIC PEPTIDE: CPT | Performed by: NURSE PRACTITIONER

## 2020-07-04 PROCEDURE — 83690 ASSAY OF LIPASE: CPT | Performed by: NURSE PRACTITIONER

## 2020-07-04 PROCEDURE — 83050 HGB METHEMOGLOBIN QUAN: CPT

## 2020-07-04 PROCEDURE — 85379 FIBRIN DEGRADATION QUANT: CPT | Performed by: NURSE PRACTITIONER

## 2020-07-04 PROCEDURE — 80053 COMPREHEN METABOLIC PANEL: CPT | Performed by: NURSE PRACTITIONER

## 2020-07-04 PROCEDURE — 93970 EXTREMITY STUDY: CPT

## 2020-07-04 PROCEDURE — 82150 ASSAY OF AMYLASE: CPT | Performed by: NURSE PRACTITIONER

## 2020-07-04 RX ORDER — KETOROLAC TROMETHAMINE 30 MG/ML
30 INJECTION, SOLUTION INTRAMUSCULAR; INTRAVENOUS ONCE
Status: COMPLETED | OUTPATIENT
Start: 2020-07-04 | End: 2020-07-04

## 2020-07-04 RX ORDER — PROCHLORPERAZINE EDISYLATE 5 MG/ML
10 INJECTION INTRAMUSCULAR; INTRAVENOUS EVERY 6 HOURS PRN
Status: DISCONTINUED | OUTPATIENT
Start: 2020-07-04 | End: 2020-07-05

## 2020-07-04 RX ORDER — LORAZEPAM 2 MG/ML
1 INJECTION INTRAMUSCULAR ONCE
Status: COMPLETED | OUTPATIENT
Start: 2020-07-04 | End: 2020-07-04

## 2020-07-04 RX ORDER — SODIUM CHLORIDE 0.9 % (FLUSH) 0.9 %
10 SYRINGE (ML) INJECTION AS NEEDED
Status: DISCONTINUED | OUTPATIENT
Start: 2020-07-04 | End: 2020-07-05 | Stop reason: HOSPADM

## 2020-07-04 RX ADMIN — PROCHLORPERAZINE EDISYLATE 10 MG: 5 INJECTION INTRAMUSCULAR; INTRAVENOUS at 21:13

## 2020-07-04 RX ADMIN — VANCOMYCIN HYDROCHLORIDE 1000 MG: 1 INJECTION, POWDER, LYOPHILIZED, FOR SOLUTION INTRAVENOUS at 22:21

## 2020-07-04 RX ADMIN — KETOROLAC TROMETHAMINE 30 MG: 30 INJECTION, SOLUTION INTRAMUSCULAR; INTRAVENOUS at 19:09

## 2020-07-04 RX ADMIN — LORAZEPAM 1 MG: 2 INJECTION INTRAMUSCULAR; INTRAVENOUS at 15:02

## 2020-07-04 RX ADMIN — PIPERACILLIN SODIUM AND TAZOBACTAM SODIUM 4.5 G: 4; .5 INJECTION, POWDER, LYOPHILIZED, FOR SOLUTION INTRAVENOUS at 21:13

## 2020-07-04 RX ADMIN — MORPHINE SULFATE 4 MG: 4 INJECTION, SOLUTION INTRAMUSCULAR; INTRAVENOUS at 21:13

## 2020-07-04 NOTE — ED NOTES
Medical request form sent to [facility].     Faby Vincent  07/04/20 6877

## 2020-07-04 NOTE — ED NOTES
Patient's daughter, Janessa Patterson, called for update on patient. Advised daughter nurse is in room with patient at this time. Daughter states patient was seen at [facility] approx 4 days ago. She states they wanted to admit her to the hospital for CHF, but she did not want to stay. She states patient's shortness of breath has worsened over the past two days. She reports hx of CHF, COPD, mitral valve. She states patient tested negative for COVID at [facility].     Elsa Rendon, RN  07/04/20 2558

## 2020-07-05 ENCOUNTER — APPOINTMENT (OUTPATIENT)
Dept: CARDIOLOGY | Facility: HOSPITAL | Age: 45
End: 2020-07-05

## 2020-07-05 VITALS
OXYGEN SATURATION: 98 % | RESPIRATION RATE: 20 BRPM | TEMPERATURE: 98.8 F | SYSTOLIC BLOOD PRESSURE: 109 MMHG | BODY MASS INDEX: 51.91 KG/M2 | WEIGHT: 293 LBS | DIASTOLIC BLOOD PRESSURE: 61 MMHG | HEIGHT: 63 IN | HEART RATE: 75 BPM

## 2020-07-05 LAB
6-ACETYL MORPHINE: NEGATIVE
A-A DO2: 54.3 MMHG (ref 0–300)
ALBUMIN SERPL-MCNC: 3.31 G/DL (ref 3.5–5.2)
ALBUMIN/GLOB SERPL: 1 G/DL
ALP SERPL-CCNC: 162 U/L (ref 39–117)
ALT SERPL W P-5'-P-CCNC: 17 U/L (ref 1–33)
AMPHET+METHAMPHET UR QL: NEGATIVE
ANION GAP SERPL CALCULATED.3IONS-SCNC: 12.9 MMOL/L (ref 5–15)
ARTERIAL PATENCY WRIST A: POSITIVE
AST SERPL-CCNC: 17 U/L (ref 1–32)
ATMOSPHERIC PRESS: 726 MMHG
BARBITURATES UR QL SCN: NEGATIVE
BASE EXCESS BLDA CALC-SCNC: 11.7 MMOL/L (ref 0–2)
BASOPHILS # BLD AUTO: 0.04 10*3/MM3 (ref 0–0.2)
BASOPHILS NFR BLD AUTO: 0.4 % (ref 0–1.5)
BDY SITE: ABNORMAL
BENZODIAZ UR QL SCN: NEGATIVE
BH CV ECHO MEAS - ACS: 2.1 CM
BH CV ECHO MEAS - AO MAX PG (FULL): 7.9 MMHG
BH CV ECHO MEAS - AO MAX PG: 19.2 MMHG
BH CV ECHO MEAS - AO MEAN PG (FULL): 4 MMHG
BH CV ECHO MEAS - AO MEAN PG: 9 MMHG
BH CV ECHO MEAS - AO ROOT AREA (BSA CORRECTED): 1.3
BH CV ECHO MEAS - AO ROOT AREA: 7.5 CM^2
BH CV ECHO MEAS - AO ROOT DIAM: 3.1 CM
BH CV ECHO MEAS - AO V2 MAX: 219 CM/SEC
BH CV ECHO MEAS - AO V2 MEAN: 135 CM/SEC
BH CV ECHO MEAS - AO V2 VTI: 41.7 CM
BH CV ECHO MEAS - AVA(I,A): 2.8 CM^2
BH CV ECHO MEAS - AVA(I,D): 2.8 CM^2
BH CV ECHO MEAS - AVA(V,A): 2.7 CM^2
BH CV ECHO MEAS - AVA(V,D): 2.7 CM^2
BH CV ECHO MEAS - BSA(HAYCOCK): 2.7 M^2
BH CV ECHO MEAS - BSA: 2.4 M^2
BH CV ECHO MEAS - BZI_BMI: 60.1 KILOGRAMS/M^2
BH CV ECHO MEAS - BZI_METRIC_HEIGHT: 160 CM
BH CV ECHO MEAS - BZI_METRIC_WEIGHT: 153.8 KG
BH CV ECHO MEAS - EDV(CUBED): 69.9 ML
BH CV ECHO MEAS - EDV(MOD-SP4): 52.9 ML
BH CV ECHO MEAS - EDV(TEICH): 75.1 ML
BH CV ECHO MEAS - EF(CUBED): 59.8 %
BH CV ECHO MEAS - EF(MOD-SP4): 79.2 %
BH CV ECHO MEAS - EF(TEICH): 51.9 %
BH CV ECHO MEAS - ESV(CUBED): 28.1 ML
BH CV ECHO MEAS - ESV(MOD-SP4): 11 ML
BH CV ECHO MEAS - ESV(TEICH): 36.2 ML
BH CV ECHO MEAS - FS: 26.2 %
BH CV ECHO MEAS - IVS/LVPW: 0.85
BH CV ECHO MEAS - IVSD: 1.2 CM
BH CV ECHO MEAS - LA DIMENSION: 4.8 CM
BH CV ECHO MEAS - LA/AO: 1.5
BH CV ECHO MEAS - LV DIASTOLIC VOL/BSA (35-75): 21.9 ML/M^2
BH CV ECHO MEAS - LV MASS(C)D: 206.3 GRAMS
BH CV ECHO MEAS - LV MASS(C)DI: 85.3 GRAMS/M^2
BH CV ECHO MEAS - LV MAX PG: 11.3 MMHG
BH CV ECHO MEAS - LV MEAN PG: 5 MMHG
BH CV ECHO MEAS - LV SYSTOLIC VOL/BSA (12-30): 4.5 ML/M^2
BH CV ECHO MEAS - LV V1 MAX: 168 CM/SEC
BH CV ECHO MEAS - LV V1 MEAN: 103 CM/SEC
BH CV ECHO MEAS - LV V1 VTI: 33.3 CM
BH CV ECHO MEAS - LVIDD: 4.1 CM
BH CV ECHO MEAS - LVIDS: 3 CM
BH CV ECHO MEAS - LVLD AP4: 7.3 CM
BH CV ECHO MEAS - LVLS AP4: 5.9 CM
BH CV ECHO MEAS - LVOT AREA (M): 3.5 CM^2
BH CV ECHO MEAS - LVOT AREA: 3.5 CM^2
BH CV ECHO MEAS - LVOT DIAM: 2.1 CM
BH CV ECHO MEAS - LVPWD: 1.5 CM
BH CV ECHO MEAS - MV A MAX VEL: 109 CM/SEC
BH CV ECHO MEAS - MV E MAX VEL: 81.2 CM/SEC
BH CV ECHO MEAS - MV E/A: 0.74
BH CV ECHO MEAS - PA ACC TIME: 0.1 SEC
BH CV ECHO MEAS - PA PR(ACCEL): 33.1 MMHG
BH CV ECHO MEAS - RAP SYSTOLE: 10 MMHG
BH CV ECHO MEAS - RVSP: 34 MMHG
BH CV ECHO MEAS - SI(AO): 130.1 ML/M^2
BH CV ECHO MEAS - SI(CUBED): 17.3 ML/M^2
BH CV ECHO MEAS - SI(LVOT): 47.7 ML/M^2
BH CV ECHO MEAS - SI(MOD-SP4): 17.3 ML/M^2
BH CV ECHO MEAS - SI(TEICH): 16.1 ML/M^2
BH CV ECHO MEAS - SV(AO): 314.7 ML
BH CV ECHO MEAS - SV(CUBED): 41.8 ML
BH CV ECHO MEAS - SV(LVOT): 115.3 ML
BH CV ECHO MEAS - SV(MOD-SP4): 41.9 ML
BH CV ECHO MEAS - SV(TEICH): 38.9 ML
BH CV ECHO MEAS - TR MAX VEL: 255 CM/SEC
BILIRUB SERPL-MCNC: 0.5 MG/DL (ref 0.2–1.2)
BODY TEMPERATURE: 0 C
BUN SERPL-MCNC: 14 MG/DL (ref 6–20)
BUN/CREAT SERPL: 16.7 (ref 7–25)
BUPRENORPHINE SERPL-MCNC: POSITIVE NG/ML
CALCIUM SPEC-SCNC: 8.1 MG/DL (ref 8.6–10.5)
CANNABINOIDS SERPL QL: NEGATIVE
CHLORIDE SERPL-SCNC: 93 MMOL/L (ref 98–107)
CHOLEST SERPL-MCNC: 169 MG/DL (ref 0–200)
CO2 BLDA-SCNC: 40.2 MMOL/L (ref 22–33)
CO2 SERPL-SCNC: 34.1 MMOL/L (ref 22–29)
COCAINE UR QL: NEGATIVE
COHGB MFR BLD: 1.5 % (ref 0–5)
CREAT SERPL-MCNC: 0.84 MG/DL (ref 0.57–1)
CRP SERPL-MCNC: 3.68 MG/DL (ref 0–0.5)
D-LACTATE SERPL-SCNC: 1.4 MMOL/L (ref 0.5–2)
DEPRECATED RDW RBC AUTO: 45.5 FL (ref 37–54)
EOSINOPHIL # BLD AUTO: 0.2 10*3/MM3 (ref 0–0.4)
EOSINOPHIL NFR BLD AUTO: 1.9 % (ref 0.3–6.2)
ERYTHROCYTE [DISTWIDTH] IN BLOOD BY AUTOMATED COUNT: 15 % (ref 12.3–15.4)
FERRITIN SERPL-MCNC: 42.83 NG/ML (ref 13–150)
FOLATE SERPL-MCNC: 14.5 NG/ML (ref 4.78–24.2)
GAS FLOW AIRWAY: 2 LPM
GFR SERPL CREATININE-BSD FRML MDRD: 74 ML/MIN/1.73
GLOBULIN UR ELPH-MCNC: 3.2 GM/DL
GLUCOSE SERPL-MCNC: 116 MG/DL (ref 65–99)
HBA1C MFR BLD: 7.5 % (ref 4.8–5.6)
HCO3 BLDA-SCNC: 38.4 MMOL/L (ref 20–26)
HCT VFR BLD AUTO: 38.3 % (ref 34–46.6)
HCT VFR BLD CALC: 33.4 % (ref 38–51)
HDLC SERPL-MCNC: 40 MG/DL (ref 40–60)
HGB BLD-MCNC: 11.4 G/DL (ref 12–15.9)
HGB BLDA-MCNC: 10.9 G/DL (ref 13.5–17.5)
IMM GRANULOCYTES # BLD AUTO: 0.22 10*3/MM3 (ref 0–0.05)
IMM GRANULOCYTES NFR BLD AUTO: 2.1 % (ref 0–0.5)
INHALED O2 CONCENTRATION: 28 %
IRON 24H UR-MRATE: 82 MCG/DL (ref 37–145)
IRON SATN MFR SERPL: 21 % (ref 20–50)
LDLC SERPL CALC-MCNC: 81 MG/DL (ref 0–100)
LDLC/HDLC SERPL: 2.02 {RATIO}
LYMPHOCYTES # BLD AUTO: 3.57 10*3/MM3 (ref 0.7–3.1)
LYMPHOCYTES NFR BLD AUTO: 34.4 % (ref 19.6–45.3)
Lab: ABNORMAL
MAGNESIUM SERPL-MCNC: 1.3 MG/DL (ref 1.6–2.6)
MAXIMAL PREDICTED HEART RATE: 176 BPM
MCH RBC QN AUTO: 25.1 PG (ref 26.6–33)
MCHC RBC AUTO-ENTMCNC: 29.8 G/DL (ref 31.5–35.7)
MCV RBC AUTO: 84.2 FL (ref 79–97)
METHADONE UR QL SCN: NEGATIVE
METHGB BLD QL: <-0.1 % (ref 0–3)
MODALITY: ABNORMAL
MONOCYTES # BLD AUTO: 0.36 10*3/MM3 (ref 0.1–0.9)
MONOCYTES NFR BLD AUTO: 3.5 % (ref 5–12)
NEUTROPHILS NFR BLD AUTO: 57.7 % (ref 42.7–76)
NEUTROPHILS NFR BLD AUTO: 6 10*3/MM3 (ref 1.7–7)
NOTE: ABNORMAL
NOTIFIED BY: ABNORMAL
NOTIFIED WHO: ABNORMAL
NRBC BLD AUTO-RTO: 0 /100 WBC (ref 0–0.2)
OPIATES UR QL: POSITIVE
OXYCODONE UR QL SCN: NEGATIVE
OXYHGB MFR BLDV: 92.2 % (ref 94–99)
PCO2 BLDA: 60.8 MM HG (ref 35–45)
PCO2 TEMP ADJ BLD: ABNORMAL MM[HG]
PCP UR QL SCN: NEGATIVE
PH BLDA: 7.41 PH UNITS (ref 7.35–7.45)
PH, TEMP CORRECTED: ABNORMAL
PHOSPHATE SERPL-MCNC: 5.1 MG/DL (ref 2.5–4.5)
PLATELET # BLD AUTO: 344 10*3/MM3 (ref 140–450)
PMV BLD AUTO: 10.1 FL (ref 6–12)
PO2 BLDA: 68 MM HG (ref 83–108)
PO2 TEMP ADJ BLD: ABNORMAL MM[HG]
POTASSIUM SERPL-SCNC: 3.1 MMOL/L (ref 3.5–5.2)
POTASSIUM SERPL-SCNC: 3.8 MMOL/L (ref 3.5–5.2)
PROT SERPL-MCNC: 6.5 G/DL (ref 6–8.5)
RBC # BLD AUTO: 4.55 10*6/MM3 (ref 3.77–5.28)
SAO2 % BLDCOA: 92.9 % (ref 94–99)
SODIUM SERPL-SCNC: 140 MMOL/L (ref 136–145)
STRESS TARGET HR: 150 BPM
TIBC SERPL-MCNC: 396 MCG/DL (ref 298–536)
TRANSFERRIN SERPL-MCNC: 266 MG/DL (ref 200–360)
TRIGL SERPL-MCNC: 241 MG/DL (ref 0–150)
TROPONIN T SERPL-MCNC: <0.01 NG/ML (ref 0–0.03)
TROPONIN T SERPL-MCNC: <0.01 NG/ML (ref 0–0.03)
TSH SERPL DL<=0.05 MIU/L-ACNC: 4.7 UIU/ML (ref 0.27–4.2)
VENTILATOR MODE: ABNORMAL
VIT B12 BLD-MCNC: 416 PG/ML (ref 211–946)
VLDLC SERPL-MCNC: 48.2 MG/DL
WBC # BLD AUTO: 10.39 10*3/MM3 (ref 3.4–10.8)

## 2020-07-05 PROCEDURE — 80061 LIPID PANEL: CPT | Performed by: PHYSICIAN ASSISTANT

## 2020-07-05 PROCEDURE — 85025 COMPLETE CBC W/AUTO DIFF WBC: CPT | Performed by: INTERNAL MEDICINE

## 2020-07-05 PROCEDURE — 80307 DRUG TEST PRSMV CHEM ANLYZR: CPT | Performed by: PHYSICIAN ASSISTANT

## 2020-07-05 PROCEDURE — 82607 VITAMIN B-12: CPT | Performed by: PHYSICIAN ASSISTANT

## 2020-07-05 PROCEDURE — 83605 ASSAY OF LACTIC ACID: CPT | Performed by: PHYSICIAN ASSISTANT

## 2020-07-05 PROCEDURE — 84443 ASSAY THYROID STIM HORMONE: CPT | Performed by: PHYSICIAN ASSISTANT

## 2020-07-05 PROCEDURE — 83540 ASSAY OF IRON: CPT | Performed by: PHYSICIAN ASSISTANT

## 2020-07-05 PROCEDURE — 80053 COMPREHEN METABOLIC PANEL: CPT | Performed by: INTERNAL MEDICINE

## 2020-07-05 PROCEDURE — 25010000002 HEPARIN (PORCINE) PER 1000 UNITS: Performed by: INTERNAL MEDICINE

## 2020-07-05 PROCEDURE — 84132 ASSAY OF SERUM POTASSIUM: CPT | Performed by: PHYSICIAN ASSISTANT

## 2020-07-05 PROCEDURE — 25010000002 FUROSEMIDE PER 20 MG: Performed by: INTERNAL MEDICINE

## 2020-07-05 PROCEDURE — 93306 TTE W/DOPPLER COMPLETE: CPT

## 2020-07-05 PROCEDURE — 84484 ASSAY OF TROPONIN QUANT: CPT | Performed by: PHYSICIAN ASSISTANT

## 2020-07-05 PROCEDURE — 93306 TTE W/DOPPLER COMPLETE: CPT | Performed by: INTERNAL MEDICINE

## 2020-07-05 PROCEDURE — 83050 HGB METHEMOGLOBIN QUAN: CPT

## 2020-07-05 PROCEDURE — 99222 1ST HOSP IP/OBS MODERATE 55: CPT | Performed by: INTERNAL MEDICINE

## 2020-07-05 PROCEDURE — 84100 ASSAY OF PHOSPHORUS: CPT | Performed by: PHYSICIAN ASSISTANT

## 2020-07-05 PROCEDURE — 82728 ASSAY OF FERRITIN: CPT | Performed by: PHYSICIAN ASSISTANT

## 2020-07-05 PROCEDURE — 93005 ELECTROCARDIOGRAM TRACING: CPT | Performed by: PHYSICIAN ASSISTANT

## 2020-07-05 PROCEDURE — 93010 ELECTROCARDIOGRAM REPORT: CPT | Performed by: INTERNAL MEDICINE

## 2020-07-05 PROCEDURE — 84466 ASSAY OF TRANSFERRIN: CPT | Performed by: PHYSICIAN ASSISTANT

## 2020-07-05 PROCEDURE — 82375 ASSAY CARBOXYHB QUANT: CPT

## 2020-07-05 PROCEDURE — 99235 HOSP IP/OBS SAME DATE MOD 70: CPT | Performed by: HOSPITALIST

## 2020-07-05 PROCEDURE — 82746 ASSAY OF FOLIC ACID SERUM: CPT | Performed by: PHYSICIAN ASSISTANT

## 2020-07-05 PROCEDURE — 36600 WITHDRAWAL OF ARTERIAL BLOOD: CPT

## 2020-07-05 PROCEDURE — 82805 BLOOD GASES W/O2 SATURATION: CPT

## 2020-07-05 PROCEDURE — 86140 C-REACTIVE PROTEIN: CPT | Performed by: PHYSICIAN ASSISTANT

## 2020-07-05 PROCEDURE — 83735 ASSAY OF MAGNESIUM: CPT | Performed by: INTERNAL MEDICINE

## 2020-07-05 PROCEDURE — 94799 UNLISTED PULMONARY SVC/PX: CPT

## 2020-07-05 RX ORDER — MECLIZINE HYDROCHLORIDE 25 MG/1
25 TABLET ORAL 3 TIMES DAILY PRN
COMMUNITY
End: 2020-07-05 | Stop reason: HOSPADM

## 2020-07-05 RX ORDER — PANTOPRAZOLE SODIUM 40 MG/1
40 TABLET, DELAYED RELEASE ORAL
Status: DISCONTINUED | OUTPATIENT
Start: 2020-07-05 | End: 2020-07-05 | Stop reason: HOSPADM

## 2020-07-05 RX ORDER — POTASSIUM CHLORIDE 20 MEQ/1
40 TABLET, EXTENDED RELEASE ORAL AS NEEDED
Status: DISCONTINUED | OUTPATIENT
Start: 2020-07-05 | End: 2020-07-05 | Stop reason: HOSPADM

## 2020-07-05 RX ORDER — ACETAMINOPHEN 325 MG/1
650 TABLET ORAL EVERY 6 HOURS PRN
Status: DISCONTINUED | OUTPATIENT
Start: 2020-07-05 | End: 2020-07-05 | Stop reason: HOSPADM

## 2020-07-05 RX ORDER — PREGABALIN 100 MG/1
100 CAPSULE ORAL 3 TIMES DAILY
COMMUNITY

## 2020-07-05 RX ORDER — ASPIRIN 81 MG/1
324 TABLET, CHEWABLE ORAL ONCE
Status: COMPLETED | OUTPATIENT
Start: 2020-07-05 | End: 2020-07-05

## 2020-07-05 RX ORDER — FUROSEMIDE 10 MG/ML
20 INJECTION INTRAMUSCULAR; INTRAVENOUS ONCE
Status: COMPLETED | OUTPATIENT
Start: 2020-07-05 | End: 2020-07-05

## 2020-07-05 RX ORDER — MORPHINE SULFATE 2 MG/ML
2 INJECTION, SOLUTION INTRAMUSCULAR; INTRAVENOUS EVERY 4 HOURS PRN
Status: DISCONTINUED | OUTPATIENT
Start: 2020-07-05 | End: 2020-07-05

## 2020-07-05 RX ORDER — BUPRENORPHINE HYDROCHLORIDE AND NALOXONE HYDROCHLORIDE DIHYDRATE 8; 2 MG/1; MG/1
1 TABLET SUBLINGUAL DAILY
Status: DISCONTINUED | OUTPATIENT
Start: 2020-07-05 | End: 2020-07-05 | Stop reason: HOSPADM

## 2020-07-05 RX ORDER — FUROSEMIDE 40 MG/1
40 TABLET ORAL DAILY PRN
Qty: 15 TABLET | Refills: 0 | Status: SHIPPED | OUTPATIENT
Start: 2020-07-05

## 2020-07-05 RX ORDER — ASPIRIN 81 MG/1
81 TABLET, CHEWABLE ORAL DAILY
Status: DISCONTINUED | OUTPATIENT
Start: 2020-07-05 | End: 2020-07-05 | Stop reason: HOSPADM

## 2020-07-05 RX ORDER — NITROGLYCERIN 0.4 MG/1
0.4 TABLET SUBLINGUAL
Status: DISCONTINUED | OUTPATIENT
Start: 2020-07-05 | End: 2020-07-05 | Stop reason: HOSPADM

## 2020-07-05 RX ORDER — SODIUM CHLORIDE 0.9 % (FLUSH) 0.9 %
10 SYRINGE (ML) INJECTION EVERY 12 HOURS SCHEDULED
Status: DISCONTINUED | OUTPATIENT
Start: 2020-07-05 | End: 2020-07-05 | Stop reason: HOSPADM

## 2020-07-05 RX ORDER — NICOTINE 21 MG/24HR
1 PATCH, TRANSDERMAL 24 HOURS TRANSDERMAL
Status: DISCONTINUED | OUTPATIENT
Start: 2020-07-05 | End: 2020-07-05 | Stop reason: HOSPADM

## 2020-07-05 RX ORDER — POTASSIUM CHLORIDE 7.45 MG/ML
10 INJECTION INTRAVENOUS
Status: DISCONTINUED | OUTPATIENT
Start: 2020-07-05 | End: 2020-07-05 | Stop reason: HOSPADM

## 2020-07-05 RX ORDER — POTASSIUM CHLORIDE 1.5 G/1.77G
40 POWDER, FOR SOLUTION ORAL AS NEEDED
Status: DISCONTINUED | OUTPATIENT
Start: 2020-07-05 | End: 2020-07-05 | Stop reason: HOSPADM

## 2020-07-05 RX ORDER — ZOLPIDEM TARTRATE 5 MG/1
10 TABLET ORAL NIGHTLY PRN
Status: CANCELLED | OUTPATIENT
Start: 2020-07-05

## 2020-07-05 RX ORDER — ATORVASTATIN CALCIUM 40 MG/1
40 TABLET, FILM COATED ORAL NIGHTLY
Status: DISCONTINUED | OUTPATIENT
Start: 2020-07-05 | End: 2020-07-05 | Stop reason: HOSPADM

## 2020-07-05 RX ORDER — ROPINIROLE 1 MG/1
2 TABLET, FILM COATED ORAL ONCE
Status: COMPLETED | OUTPATIENT
Start: 2020-07-05 | End: 2020-07-05

## 2020-07-05 RX ORDER — POTASSIUM CHLORIDE 20 MEQ/1
40 TABLET, EXTENDED RELEASE ORAL EVERY 4 HOURS
Status: DISCONTINUED | OUTPATIENT
Start: 2020-07-05 | End: 2020-07-05 | Stop reason: HOSPADM

## 2020-07-05 RX ORDER — PREGABALIN 100 MG/1
100 CAPSULE ORAL 3 TIMES DAILY
Status: CANCELLED | OUTPATIENT
Start: 2020-07-05

## 2020-07-05 RX ORDER — SODIUM CHLORIDE 0.9 % (FLUSH) 0.9 %
10 SYRINGE (ML) INJECTION AS NEEDED
Status: DISCONTINUED | OUTPATIENT
Start: 2020-07-05 | End: 2020-07-05 | Stop reason: HOSPADM

## 2020-07-05 RX ORDER — NYSTATIN 100000 [USP'U]/G
POWDER TOPICAL EVERY 12 HOURS SCHEDULED
Status: DISCONTINUED | OUTPATIENT
Start: 2020-07-05 | End: 2020-07-05 | Stop reason: HOSPADM

## 2020-07-05 RX ORDER — IPRATROPIUM BROMIDE AND ALBUTEROL SULFATE 2.5; .5 MG/3ML; MG/3ML
3 SOLUTION RESPIRATORY (INHALATION) EVERY 4 HOURS PRN
Status: DISCONTINUED | OUTPATIENT
Start: 2020-07-05 | End: 2020-07-05 | Stop reason: HOSPADM

## 2020-07-05 RX ORDER — NYSTATIN 100000 [USP'U]/G
POWDER TOPICAL 2 TIMES DAILY
Qty: 15 G | Refills: 0 | Status: SHIPPED | OUTPATIENT
Start: 2020-07-05 | End: 2020-07-15

## 2020-07-05 RX ORDER — HEPARIN SODIUM 5000 [USP'U]/ML
5000 INJECTION, SOLUTION INTRAVENOUS; SUBCUTANEOUS EVERY 12 HOURS SCHEDULED
Status: DISCONTINUED | OUTPATIENT
Start: 2020-07-05 | End: 2020-07-05 | Stop reason: HOSPADM

## 2020-07-05 RX ORDER — INSULIN GLARGINE 100 [IU]/ML
10 INJECTION, SOLUTION SUBCUTANEOUS NIGHTLY
COMMUNITY

## 2020-07-05 RX ORDER — ROPINIROLE 1 MG/1
3 TABLET, FILM COATED ORAL 3 TIMES DAILY
Status: CANCELLED | OUTPATIENT
Start: 2020-07-05

## 2020-07-05 RX ORDER — MECLIZINE HCL 12.5 MG/1
25 TABLET ORAL 3 TIMES DAILY PRN
Status: CANCELLED | OUTPATIENT
Start: 2020-07-05

## 2020-07-05 RX ORDER — ONDANSETRON 2 MG/ML
4 INJECTION INTRAMUSCULAR; INTRAVENOUS EVERY 6 HOURS PRN
Status: DISCONTINUED | OUTPATIENT
Start: 2020-07-05 | End: 2020-07-05 | Stop reason: HOSPADM

## 2020-07-05 RX ADMIN — FUROSEMIDE 20 MG: 20 INJECTION, SOLUTION INTRAMUSCULAR; INTRAVENOUS at 09:01

## 2020-07-05 RX ADMIN — NYSTATIN: 100000 POWDER TOPICAL at 09:01

## 2020-07-05 RX ADMIN — POTASSIUM CHLORIDE 40 MEQ: 1500 TABLET, EXTENDED RELEASE ORAL at 10:56

## 2020-07-05 RX ADMIN — ASPIRIN 324 MG: 81 TABLET, CHEWABLE ORAL at 03:25

## 2020-07-05 RX ADMIN — ROPINIROLE HYDROCHLORIDE 2 MG: 1 TABLET, FILM COATED ORAL at 03:25

## 2020-07-05 RX ADMIN — ATORVASTATIN CALCIUM 40 MG: 40 TABLET, FILM COATED ORAL at 03:25

## 2020-07-05 RX ADMIN — HEPARIN SODIUM 5000 UNITS: 5000 INJECTION INTRAVENOUS; SUBCUTANEOUS at 03:25

## 2020-07-05 RX ADMIN — PANTOPRAZOLE SODIUM 40 MG: 40 TABLET, DELAYED RELEASE ORAL at 05:57

## 2020-07-05 RX ADMIN — NICOTINE 1 PATCH: 14 PATCH, EXTENDED RELEASE TRANSDERMAL at 09:01

## 2020-07-05 RX ADMIN — SODIUM CHLORIDE, PRESERVATIVE FREE 10 ML: 5 INJECTION INTRAVENOUS at 03:26

## 2020-07-05 RX ADMIN — BUPRENORPHINE HYDROCHLORIDE AND NALOXONE HYDROCHLORIDE DIHYDRATE 1 TABLET: 8; 2 TABLET SUBLINGUAL at 09:01

## 2020-07-05 RX ADMIN — SODIUM CHLORIDE, PRESERVATIVE FREE 10 ML: 5 INJECTION INTRAVENOUS at 09:02

## 2020-07-05 NOTE — CONSULTS
Date of Admit: 7/4/2020  Date of Consult: 07/05/20  Mady Sarmiento, DO        Edema    Morbid obesity (CMS/[facility])    CHF (congestive heart failure) (CMS/[facility])    GERD (gastroesophageal reflux disease)    Anxiety disorder    Hyperlipidemia    Essential hypertension    Pseudotumor cerebri    Restless leg      Assessment      1. Chest pain,atypical,  troponin negative, EKG tracing reviewed and shows no acute ischemia  2. Hyperlipidemia, on statin  3. Hypokalemia  4. Tobacco abuse  5. Morbid obesity    Recommendations     1. Chest pain  · Ruled out for MI.  Chest pain is atypical.  Troponin negative and EKG without acute ischemia.  Echocardiogram showed normal EF with no wall motion abnormalities.  · Continue with aspirin and Lipitor.  · Patient does not appear to be in CHF.   · No further cardiac work-up warranted at this time.  · She does have unintentional weight loss, with significant retroperitoneal lymphadenopathy concerning for lymphoma which would need further evaluation .  · Will sign off , please call for any questions.         Reason for consultation: Chest pain     Subjective       Subjective     History of Present Illness     Raquel Ridley is a 44-year-old female with a past medical history significant for hyperlipidemia, essential hypertension, anxiety, morbid obesity and pseudotumor cerebri.  Patient states that for the last couple weeks she has been experiencing intermittent chest pains and shortness of breath.  She describes the pain as a pressure/tightness in the middle of her chest that radiates to her left shoulder.  She denies any alleviating or aggravating factors.  The pain comes at random.  She denies any associated nausea or diaphoresis.  She also has had worsening edema in the lower extremities and abdomen with unintentional weight loss.  She reports that she was seen at [facility] on several different occasions in June.  Per chart review she was seen in the ER on June 28 for  shortness of breath and was diagnosed with acute COPD exacerbation and it was recommended that the patient be admitted but she declined.  She was seen again on 2020 and was diagnosed with acute COPD exacerbation and agreed to be admitted however she left AGAINST MEDICAL ADVICE once arriving to the floor.  She denies any history of coronary artery disease.  She is a smoker and smokes about 1 pack of cigarettes a day in addition to vaping.  She reports taking all her medications regularly.    Cardiac risk factors:diabetes mellitus, hypercholesterolemia, hypertension, smoking, Sedentary life style and Obesity    Last Stress: 2019  · Left ventricular ejection fraction is normal (Calculated EF = 57%).  · Myocardial perfusion imaging indicates a normal myocardial perfusion study with no evidence of ischemia.  · Impressions are consistent with a low risk study.  · Findings consistent with a normal ECG stress test.    Past Medical History:   Diagnosis Date   • Anxiety disorder 2020   • Arthritis    • CHF (congestive heart failure) (CMS/[facility])    • COPD (chronic obstructive pulmonary disease) (CMS/[facility])    • Diabetes mellitus (CMS/[facility])    • GERD (gastroesophageal reflux disease)    • Hyperlipidemia    • Hypertension    • Morbid obesity (CMS/[facility]) 2020   • Pseudotumor cerebri    • Restless leg    • Sleep apnea      Past Surgical History:   Procedure Laterality Date   •  SECTION     • CHOLECYSTECTOMY     • TONSILLECTOMY     • TUBAL ABDOMINAL LIGATION       Family History   Problem Relation Age of Onset   • Diabetes Mother    • Hypertension Mother    • Stroke Father    • Hypertension Father    • Diabetes Father    • Cancer Paternal Grandmother         breat and liver      Social History     Tobacco Use   • Smoking status: Current Every Day Smoker     Packs/day: 1.00     Types: Cigarettes, Electronic Cigarette   • Smokeless tobacco: Never Used   Substance Use Topics   • Alcohol use: No     Frequency: Never    • Drug use: No     Medications Prior to Admission   Medication Sig Dispense Refill Last Dose   • amitriptyline (ELAVIL) 75 MG tablet Take 75 mg by mouth Every Night.   8/18/2019 at Unknown time   • aspirin 81 MG EC tablet Take 1 tablet by mouth Daily. 30 tablet 0    • atorvastatin (LIPITOR) 40 MG tablet Take 1 tablet by mouth Every Night. 30 tablet 0    • buprenorphine-naloxone (SUBOXONE) 8-2 MG film film Place 2 films under the tongue Daily.   8/19/2019 at 0700   • buPROPion SR (WELLBUTRIN SR) 150 MG 12 hr tablet Take 150 mg by mouth 2 (Two) Times a Day.   8/19/2019 at 0700   • chlorthalidone (HYGROTON) 25 MG tablet Take 25 mg by mouth Daily.   8/19/2019 at 0700   • CloNIDine (CATAPRES) 0.1 MG tablet Take 0.1 mg by mouth 2 (Two) Times a Day.   8/19/2019 at 0700   • FLUoxetine (PROzac) 20 MG capsule Take 60 mg by mouth Daily.   8/19/2019 at 0700   • losartan (COZAAR) 100 MG tablet Take 100 mg by mouth Daily.   8/19/2019 at 0700   • omeprazole (priLOSEC) 20 MG capsule Take 20 mg by mouth Daily.   8/19/2019 at 0700   • prazosin (MINIPRESS) 5 MG capsule Take 5 mg by mouth Every Night.   8/18/2019 at Unknown time   • rOPINIRole (REQUIP) 3 MG tablet Take 3 mg by mouth 2 (Two) Times a Day As Needed.   8/19/2019 at 0700   • tiZANidine (ZANAFLEX) 4 MG tablet Take 4 mg by mouth Every 8 (Eight) Hours As Needed for Muscle Spasms.   8/19/2019 at 0700   • zolpidem (AMBIEN) 10 MG tablet Take 10 mg by mouth At Night As Needed for Sleep.   8/18/2019 at Unknown time     Allergies:  Erythromycin    Review of Systems   Constitutional: Positive for fatigue and unexpected weight change. Negative for chills, diaphoresis and fever.   HENT: Negative for congestion and trouble swallowing.    Eyes: Negative for photophobia and visual disturbance.   Respiratory: Positive for chest tightness, shortness of breath and wheezing.    Cardiovascular: Positive for chest pain, palpitations and leg swelling.   Gastrointestinal: Negative for abdominal  pain, nausea and vomiting.   Endocrine: Negative for polyphagia and polyuria.   Genitourinary: Negative for dysuria and hematuria.   Musculoskeletal: Negative for neck pain and neck stiffness.   Skin: Negative for rash and wound.   Allergic/Immunologic: Negative for food allergies and immunocompromised state.   Neurological: Positive for dizziness and weakness. Negative for light-headedness.   Hematological: Does not bruise/bleed easily.   Psychiatric/Behavioral: Positive for sleep disturbance. Negative for confusion and suicidal ideas.       Objective       Objective      Vital Signs  Temp:  [98 °F (36.7 °C)-99.3 °F (37.4 °C)] 98 °F (36.7 °C)  Heart Rate:  [] 64  Resp:  [17-20] 20  BP: (105-135)/(62-79) 118/64     Vital Signs (last 72 hrs)       07/02 0700  -  07/03 0659 07/03 0700  -  07/04 0659 07/04 0700  -  07/05 0659 07/05 0700  -  07/05 0842   Most Recent    Temp (°F)     98 -  99.3       98 (36.7)    Heart Rate     62 -  100       64    Resp     17 -  20       20    BP     105/71 -  135/69       118/64    SpO2 (%)     92 -  98      97     97        Body mass index is 60.18 kg/m².  No intake or output data in the 24 hours ending 07/05/20 0842  Physical Exam   Constitutional: She is oriented to person, place, and time. She appears well-developed and well-nourished.   Obese   HENT:   Head: Normocephalic and atraumatic.   Neck: Normal range of motion.   Cardiovascular: Normal rate, regular rhythm and normal heart sounds.   No murmur heard.  Pulmonary/Chest: Effort normal. No respiratory distress. She has no wheezes.   Decreased breath sounds    Abdominal: Soft. Bowel sounds are normal. She exhibits no distension. There is no tenderness.   Musculoskeletal: She exhibits edema (1+ BLE).   Neurological: She is alert and oriented to person, place, and time.   Psychiatric: She has a normal mood and affect. Her behavior is normal.     Results review     Results Review:    I reviewed the patient's new clinical  results.  Results from last 7 days   Lab Units 07/05/20  0721 07/05/20  0303 07/04/20  1801 07/04/20  1459   TROPONIN T ng/mL <0.010 <0.010 <0.010 <0.010     Results from last 7 days   Lab Units 07/05/20  0721 07/04/20  1459   WBC 10*3/mm3 10.39 20.45*   HEMOGLOBIN g/dL 11.4* 11.8*   PLATELETS 10*3/mm3 344 421     Results from last 7 days   Lab Units 07/05/20  0721 07/05/20  0303 07/04/20  1459   SODIUM mmol/L 140  --  143   POTASSIUM mmol/L 3.1* 3.8 3.3*   CHLORIDE mmol/L 93*  --  92*   CO2 mmol/L 34.1*  --  34.7*   BUN mg/dL 14  --  12   CREATININE mg/dL 0.84  --  0.85   CALCIUM mg/dL 8.1*  --  9.0   GLUCOSE mg/dL 116*  --  102*   ALT (SGPT) U/L 17  --  18   AST (SGOT) U/L 17  --  15     Lab Results   Component Value Date    INR 0.95 08/19/2019     Lab Results   Component Value Date    MG 1.3 (L) 07/05/2020     Lab Results   Component Value Date    TSH 4.700 (H) 07/05/2020    TRIG 241 (H) 07/05/2020    HDL 40 07/05/2020    LDL 81 07/05/2020      Lab Results   Component Value Date    PROBNP 94.9 07/04/2020    PROBNP <5.0 (L) 08/20/2019    PROBNP 21.2 08/19/2019       ECG  ECG/EMG Results (last 24 hours)     Procedure Component Value Units Date/Time    ECG 12 Lead [[id number]] Collected:  07/04/20 1417     Updated:  07/04/20 1510    Narrative:       Test Reason : chest pain, shortness of breath  Blood Pressure : **/** mmHG  Vent. Rate : 090 BPM     Atrial Rate : 090 BPM     P-R Int : 134 ms          QRS Dur : 080 ms      QT Int : 360 ms       P-R-T Axes : 044 054 032 degrees     QTc Int : 440 ms    Normal sinus rhythm  Nonspecific ST and T wave abnormality  Abnormal ECG  When compared with ECG of 20-AUG-2019 06:38,  No significant change was found  Confirmed by David He (2020) on 7/4/2020 3:10:17 PM    Referred By:  CURD           Confirmed By:David He    ECG 12 Lead [[id number]] Collected:  07/05/20 0247     Updated:  07/05/20 0248    Narrative:       Test Reason : chest pain  Blood Pressure :  **/** mmHG  Vent. Rate : 075 BPM     Atrial Rate : 075 BPM     P-R Int : 150 ms          QRS Dur : 080 ms      QT Int : 440 ms       P-R-T Axes : 053 066 050 degrees     QTc Int : 491 ms    Normal sinus rhythm  Prolonged QT  Abnormal ECG  When compared with ECG of 04-JUL-2020 14:17,  QT has lengthened    Referred By:  AMARJIT           Confirmed By:     ECG 12 Lead [[id number]] Collected:  07/05/20 0701     Updated:  07/05/20 0702    Narrative:       Test Reason : chest pain  Blood Pressure : **/** mmHG  Vent. Rate : 070 BPM     Atrial Rate : 070 BPM     P-R Int : 142 ms          QRS Dur : 080 ms      QT Int : 420 ms       P-R-T Axes : 052 071 057 degrees     QTc Int : 453 ms    Normal sinus rhythm  Normal ECG  When compared with ECG of 05-JUL-2020 02:47, (Unconfirmed)  No significant change was found    Referred By:  JURGEN           Confirmed By:     Adult Transthoracic Echo Complete W/ Cont if Necessary Per Protocol [[id number]] Resulted:  07/05/20 0808     Updated:  07/05/20 0808          Imaging Results (Last 72 Hours)     Procedure Component Value Units Date/Time    US Venous Doppler Lower Extremity Bilateral (duplex) [[id number]] Collected:  07/04/20 1900     Updated:  07/04/20 1902    Narrative:       US Veins LE Duplex BILAT    HISTORY:   Swelling redness and pain bilateral lower extremities. Morbid obesity. ER evaluation.    TECHNIQUE:   Real-time ultrasound was performed of both lower extremities utilizing spectral and color Doppler with compression and augmentation techniques.    COMPARISON:  Doppler venous lower extremity bilateral from 8/19/2019.    FINDINGS:  Right Lower Extremity:  There is no deep venous thrombus seen in the right lower extremity, including the right common femoral veins, right femoral veins and right popliteal veins.  Normal compressibility and respiratory phasicity was visualized.  No calf vein thrombus.    Left Lower Extremity:  There is no deep venous thrombus seen in the left lower  extremity, including the left common femoral veins, left femoral veins and left popliteal veins.   Normal compressibility and respiratory phasicity was visualized.  No calf vein thrombus.    Technologist notes that they were unable to visualize the peroneal veins or mid posterior tibial veins on either side due to the patient's morbid obesity and edema. This is a limited exam.        Impression:       Limited evaluation of the bilateral lower extremity deep venous system shows no convincing evidence for acute appearing deep venous thrombosis. Technologist was unable to visualize the peroneal veins or mid portion posterior tibial veins or anterior  tibial veins secondary to the patient's morbid obesity. There is evidence for soft tissue edema.    Signer Name: Veronica Cardozo MD   Signed: 7/4/2020 7:00 PM   Workstation Name: [email or web address]    [facility]    CT Abdomen Pelvis Without Contrast [[id number]] Collected:  07/04/20 1837     Updated:  07/04/20 1839    Narrative:       INDICATION:   Abdominal swelling. ER evaluation.    TECHNIQUE:   CT of the abdomen and pelvis without contrast. Coronal and sagittal reconstructions were obtained.  Radiation dose reduction techniques included automated exposure control or exposure modulation based on body size. Radiation audit for number of CT and  nuclear cardiology exams performed in the last year: 2.      COMPARISON:   See the accompanying chest CT dictation.    FINDINGS:  Lung bases: 80 accounting chest CT dictation.    Abdomen: Lack of IV contrast media limits the study.    There is a large amount of soft tissue edema seen in the anterior, lateral, and posterior abdominal subcutaneous fat .    There is mild diffuse fatty infiltration of liver. The spleen is unremarkable. The patient is postcholecystectomy. The pancreas is largely fatty replaced. The adrenal glands are unremarkable. There is no evidence for intrarenal calculus or  hydronephrosis.    There is  no evidence for bowel obstruction. Appendix is unremarkable.    There is lymphadenopathy in the retroperitoneum and inguinal regions bilaterally. There our smaller mesenteric lymph nodes and stranding in the mesentery in general. This should be further evaluated. Correlation with an abdomen pelvis CT scan with IV and  oral contrast media recommended. Please correlate for any clinical concern for lymphoma. Lymphadenopathy is nonspecific at this time. I other neoplastic, infectious, or inflammatory disease is also the differential. Tissue diagnosis may be helpful.  Largest left inguinal node is about 1.3 x 2 cm dimension. Largest right inguinal node is about 1.5 x 2 cm dimension. There is a left para-aortic node just below the left renal artery at its about 1.9 x 1.5 cm dimension. There is a right common  Iliac chain node that is about 1.3 cm in diameter. The right external iliac chain lymph node that is about 1.3 cm short axis dimension.    There is degenerative change in the spine.    Pelvis: Bladder is unremarkable. Uterus and adnexal structures are unremarkable.      Impression:         1. There is lymphadenopathy in the retroperitoneum and extending into the bilateral inguinal regions. There is also mild stranding of the mesenteric fat in general with some prominent mesenteric lymph nodes. This should be further evaluated. Please  correlate for any clinical history of or evidence for lymphoma. Other neoplastic infectious or inflammatory diseases are all in the differential.  2. There is extensive edema in the subcutaneous fat of the abdomen most prominent anteriorly.  3. There is no ascites. There is diffuse fatty infiltration of the liver.  4. Postcholecystectomy.  5. Study is limited by lack of IV and oral contrast media. Follow-up study with both oral and IV contrast media may be helpful.        Signer Name: Veronica Cardozo MD   Signed: 7/4/2020 6:37 PM   Workstation Name: [email or web address]    [facility]    CT Chest Without Contrast [[id number]] Collected:  07/04/20 1826     Updated:  07/04/20 1828    Narrative:       INDICATION:    Chest pain and short of breath ER evaluation    TECHNIQUE:   CT of the chest without contrast. Coronal and sagittal reconstructions were obtained.  Radiation dose reduction techniques included automated exposure control or exposure modulation based on body size. Radiation audit for number of CT and nuclear  cardiology exams performed in the last year: 2.      COMPARISON:    Earlier chest x-ray and CT chest PE protocol from 8/19/2019.    FINDINGS:  Study is limited by lack of IV contrast media. There is greater than no axillary mediastinal or definite hilar adenopathy allowing for the lack of IV contrast media. There is no pleural or pericardial effusion. Heart size is within normal limits. There  is a prominent right and left anterior pericardial fat pad which results in the obscuration of the heart border on the frontal view and account for the abnormalities on the plain film. There is no evidence for acute infiltrate. There is a small amount of  dependent atelectasis.    See separate abdomen pelvis CT dictation. There is a small sclerotic lesion in the sternum which is stable and likely a bone island.      Impression:         1. This patient has prominent pericardial fat pads which account for the obscuration of the heart border on the plain film. Cardiac silhouette size is normal and there is no acute-appearing infiltrate. No acute congestive failure appreciated. There is a  small amount of dependent atelectasis.  2. Please refer the separate abdomen pelvis CT dictation.    Signer Name: Veronica Cardozo MD   Signed: 7/4/2020 6:26 PM   Workstation Name: [email or web address]    [facility]    XR Chest 1 View [[id number]] Collected:  07/04/20 1555     Updated:  07/04/20 1557    Narrative:       CR Chest 1 Vw    INDICATION:   Chest pain short of breath     COMPARISON:     Chest x-ray 8/19/2019.    FINDINGS:  Single portable AP view(s) of the chest.  Cardiac silhouette is mildly enlarged and increased from prior. There is subtle increase in central vascular and interstitial markings from prior with development of patchy bibasilar airspace disease. Please  correlate for clinical evidence of subtle volume overload. No obvious pleural effusion. No pneumothorax.      Impression:       Mild increase in cardiac silhouette size central vascular and interstitial markings as prior study raising concern for mild volume overload. Patchy airspace disease at the lung bases likely some patchy pulmonary edema. Follow-up recommended to ensure  resolution    Signer Name: Veronica Cardozo MD   Signed: 7/4/2020 3:55 PM   [facility]: [email or web address]    [facility]          I have discussed my impression and recommendations with the patient and family.    Thank you very much for asking us to be involved in this patient's care.  We will follow along with you.      GLO Don  07/05/20  08:42    Please note that portions of this note were completed with a voice recognition program.

## 2020-07-05 NOTE — ED NOTES
Called [facility] supervisor. Left voicemail requesting records from 4/2/20 be faxed.     Kayce Friedman  07/04/20 2004

## 2020-07-05 NOTE — ED PROVIDER NOTES
Subjective   Patient presents to the ER with multiple complaints.  She reports that over the last two weeks she has gained over 25lbs in fluid.  She was suppose to be admitted at [facility] a couple of days ago for CHF but she signed out AMA.  She started having more difficulty breathing as she started to swell more.           Review of Systems   Constitutional: Negative.  Negative for fever.   HENT: Negative.    Respiratory: Negative.    Cardiovascular: Negative.  Negative for chest pain.   Gastrointestinal: Negative.  Negative for abdominal pain.   Endocrine: Negative.    Genitourinary: Negative.  Negative for dysuria.   Skin: Negative.    Neurological: Negative.    Psychiatric/Behavioral: Negative.    All other systems reviewed and are negative.      Past Medical History:   Diagnosis Date   • Anxiety disorder 2020   • Arthritis    • CHF (congestive heart failure) (CMS/[facility])    • COPD (chronic obstructive pulmonary disease) (CMS/[facility])    • Diabetes mellitus (CMS/[facility])    • GERD (gastroesophageal reflux disease)    • Hyperlipidemia    • Hypertension    • Morbid obesity (CMS/[facility]) 2020   • Pseudotumor cerebri    • Restless leg    • Sleep apnea        Allergies   Allergen Reactions   • Erythromycin Rash       Past Surgical History:   Procedure Laterality Date   •  SECTION     • CHOLECYSTECTOMY     • TONSILLECTOMY     • TUBAL ABDOMINAL LIGATION         History reviewed. No pertinent family history.    Social History     Socioeconomic History   • Marital status: Legally      Spouse name: Not on file   • Number of children: Not on file   • Years of education: Not on file   • Highest education level: Not on file   Tobacco Use   • Smoking status: Never Smoker   • Smokeless tobacco: Never Used   • Tobacco comment: uses a vapor   Substance and Sexual Activity   • Alcohol use: No     Frequency: Never   • Drug use: No   • Sexual activity: Defer           Objective   Physical Exam   Constitutional:  She is oriented to person, place, and time.   Morbidly obese    HENT:   Head: Normocephalic and atraumatic.   Right Ear: External ear normal.   Left Ear: External ear normal.   Nose: Nose normal.   Eyes: Pupils are equal, round, and reactive to light. Conjunctivae and EOM are normal.   Neck: Normal range of motion. Neck supple. No JVD present. No tracheal deviation present.   Cardiovascular: Normal rate, regular rhythm and normal heart sounds.   No murmur heard.  Pulmonary/Chest: Effort normal and breath sounds normal. No respiratory distress. She has no wheezes.   Abdominal: Soft. Bowel sounds are normal. There is tenderness.   Musculoskeletal: Normal range of motion. She exhibits no edema or deformity.   Neurological: She is alert and oriented to person, place, and time. No cranial nerve deficit.   Skin: Skin is warm and dry. No rash noted. She is not diaphoretic. There is erythema. No pallor.   Bilateral +2 pitting edema in lower extremities with associated erythema.    Psychiatric: She has a normal mood and affect. Her behavior is normal. Thought content normal.   Nursing note and vitals reviewed.      Procedures           ED Course  ED Course as of Jul 05 0121   Sat Jul 04, 2020 2104 I have personally seen and evaluated this patient and agree with Matilda's plan, documentation.  The patient is currently resting calmly with no complaints    [EG]      ED Course User Index  [EG] Mady Sarmiento,                                            MDM  Number of Diagnoses or Management Options  Cellulitis of lower extremity, unspecified laterality: new and requires workup  Risk of Complications, Morbidity, and/or Mortality  Presenting problems: moderate  Diagnostic procedures: moderate  Management options: moderate        Final diagnoses:   Cellulitis of lower extremity, unspecified laterality            Matilda Sung, GLO  07/05/20 0121

## 2020-07-05 NOTE — ED NOTES
Called report to [facility]. Spoke with FRAN Izquierdo. Skin WDL. IV patent. Patient alert and oriented and aware of admission.      Collette, Ashley N, RN  07/04/20 8806

## 2020-07-05 NOTE — PLAN OF CARE
PATIENT CAME TO FLOOR VERY UPSET ABOUT NOT RECEIVING HER REQUIP IN THE ER AND BEING HUNGRY HAS CALMED DOWN AFTER BEING INFORMED SHE COULD HAVE FOOD AND HER MEDICATION BEING ORDERED

## 2020-07-05 NOTE — DISCHARGE SUMMARY
[facility] HOSPITALIST MEDICINE DISCHARGE SUMMARY    Patient Identification:  Name:  Raquel Ridley  Age:  44 y.o.  Sex:  female  :  1975  MRN:  [id number]  Visit Number:  [id number]    Date of Admission: 2020  Date of Discharge: 2020  DISCHARGE DISPOSITION   Stable  PCP: Elsa Sharma APRN    DISCHARGE DIAGNOSIS : Transient leukocytosis repeat is normal, no obvious signs of infection on work-up, morbid obesity with BMI of 60.2, tobacco use disorder been counseled regarding cessation, COPD without exacerbation, recent hospitalization at [facility] for COPD exacerbation, apparent CHF no obvious findings of CHF on this admission 2D echo shows normal ejection fraction with grade 1 diastolic dysfunction, diabetes type 2 insulin requiring, essential hypertension, restless leg syndrome, history of opiate dependence on Suboxone, incidental finding of bilateral inguinal and retroperitoneal lymphadenopathy with apparent history of lymphadenopathy 10 years ago to follow-up with Dr. Lewis outpatient for possible evaluation for biopsy.  Liver steatosis on CAT scan.  Bilateral inguinal intertrigo.    HOSPITAL COURSE  Patient is a 44 y.o. female presented to [facility] complaining of bloating, leg swelling dyspnea on exertion, for further history and physical exam I would refer you to Dr. Padgett in [facility]'s  admission note.  Patient came in with chief complaints of bloating and edema.  On admission she does have abdominal wall edema lower aspect and suprapubic area, also has a white count of 20,000 she was just discharged/signed out AMA for recent diagnosis of possible CHF and COPD exacerbation at [facility].  Patient reports she took prednisone called in by her primary care by the recently.  Work-up includes CT scan of the abdomen pelvis which shows abdominal wall edema, there is incidental finding of lymphadenopathy both inguinal and retroperitoneal.   Repeat CBC for leukocytosis shows normal white count.  She remained afebrile during his stay, the vague symptoms of leg swelling bloating and chest discomfort was addressed with cardiac enzymes serially which all came back negative, proBNP is normal, cardiology was consulted and felt the patient does not have CHF which I concur.  Patient is lying in bed flat without any signs of dyspnea or shortness of breath.  She has no edema of the legs on exam or JVD.  She also has no CHF.  with regards to the incidental finding of bilateral lymphadenopathy of the groin which is not palpable on exam, she reports she was told this in the past approximate 10 years ago and supposed to follow-up but never did.  Dr. Chowdhury will follow the patient outpatient for evaluation and possibility of biopsy.  With regards to her abdominal wall edema I suspect is multifactorial due to her obesity, positional, and recent prednisone use I will prescribe her with a few tablets of Lasix to take as needed.  Smoking cessation counseling has been imparted to the patient.  She will follow-up with her primary care as scheduled.      VITAL SIGNS:      07/04/20  1432 07/05/20  0000   Weight: (!) 161 kg (355 lb) (!) 154 kg (339 lb 11.2 oz)     Body mass index is 60.18 kg/m².  Vitals:    07/05/20 1019   BP: 109/61   Pulse: 75   Resp: 20   Temp: 98.8 °F (37.1 °C)   SpO2: 98%     PHYSICAL EXAM:  General: Comfortable,awake, alert, oriented to self, place, and time, well-developed and well-nourished.  No respiratory distress.  She is in bed laying flat not orthopneic   Skin:  Skin is warm and dry. No rash noted. No pallor.    No rash  HENT:  Head:  Normocephalic and atraumatic.  Mouth:  Moist mucous membranes.    Eyes:  Conjunctivae and EOM are normal.  Pupils are equal, round, and reactive to light.  No scleral icterus.    Neck:  Neck supple.  No JVD present.    No hepatojugular reflux  Pulmonary/Chest:  No respiratory distress, no wheezes, no crackles, with  normal breath sounds and good air movement.  Cardiovascular:  Normal rate, regular rhythm and normal heart sounds with no murmur.  Abdominal:  Soft.  Bowel sounds are normal.  No distension and no tenderness.  Morbidly obese, she has mild edema dependent on the abdominal wall suprapubic area.  No guarding no rigidity.  Extremities:  No edema, no tenderness, and no deformity.  No red or swollen joints anywhere.  Strong pulses in all 4 extremities with no clubbing, no cyanosis, no edema.  No Homans sign no symmetrical legs pain  Neurological:  Motor strength equal no obvious deficit, sensory grossly intact.   No cranial nerve deficit.  No tongue deviation.  No facial droop.  No slurred speech.    Genitourinary: I could not feel any palpable mass or lymph node.  She has intertrigo tinea infection pain  Back: No skin break  ----------    DISCHARGE MEDICATIONS:     Discharge Medications      New Medications      Instructions Start Date   furosemide 40 MG tablet  Commonly known as:  Lasix   40 mg, Oral, Daily PRN      nystatin 319419 UNIT/GM powder  Commonly known as:  MYCOSTATIN   Topical, 2 Times Daily         Continue These Medications      Instructions Start Date   buprenorphine-naloxone 8-2 MG film film  Commonly known as:  SUBOXONE   2 films, Sublingual, Daily      insulin aspart 100 UNIT/ML injection  Commonly known as:  novoLOG   10 Units, Subcutaneous, 3 Times Daily Before Meals      insulin glargine 100 UNIT/ML injection  Commonly known as:  LANTUS   10 Units, Subcutaneous, Nightly      pregabalin 100 MG capsule  Commonly known as:  LYRICA   100 mg, Oral, 3 Times Daily      rOPINIRole 3 MG tablet  Commonly known as:  REQUIP   3 mg, Oral, 3 Times Daily      zolpidem 10 MG tablet  Commonly known as:  AMBIEN   10 mg, Oral, Nightly PRN         Stop These Medications    meclizine 25 MG tablet  Commonly known as:  ANTIVERT                  Additional Instructions for the Follow-ups that You Need to Schedule      Discharge Follow-up with PCP   As directed       Currently Documented PCP:    Elsa Sharma APRN    PCP Phone Number:    [phone]     Follow Up Details:  [facility]         Discharge Follow-up with Specified Provider: Dr. Chowdhury; 3 Weeks   As directed      To:  Dr. Chowdhury    Follow Up:  3 Weeks    Follow Up Details:  Lymphadenopathy           Follow-up Information     Elsa Sharma APRN .    Specialties:  Nurse Practitioner, Emergency Medicine  Why:  [facility]  Contact information:  [address] [place] [address]  [phone]                   Karlie Gould MD  07/05/20  12:45    Please note that this discharge summary required more than 30 minutes to complete.

## 2020-07-06 ENCOUNTER — READMISSION MANAGEMENT (OUTPATIENT)
Dept: CALL CENTER | Facility: HOSPITAL | Age: 45
End: 2020-07-06

## 2020-07-06 ENCOUNTER — TELEPHONE (OUTPATIENT)
Dept: MEDSURG UNIT | Facility: HOSPITAL | Age: 45
End: 2020-07-06

## 2020-07-06 NOTE — PAYOR COMM NOTE
"Contact: Marcela Montalvo RN @ [facility]  Phone: [phone]  Fax: [phone]    Ref# [id number]  Inpatient status      Raquel Ridley (44 y.o. Female)     Date of Birth Social Security Number Address Home Phone MRN    1975  [address] [place] [address]  [facility] [address] [phone] [id number]    Anglican Marital Status          Yarsani Legally        Admission Date Admission Type Admitting Provider Attending Provider Department, Room/Bed    7/4/20 Emergency Leelee Padgett,   [facility] [address] [place], 3347/1S    Discharge Date Discharge Disposition Discharge Destination        7/5/2020 Home or Self Care              Attending Provider:  (none)   Allergies:  Erythromycin    Isolation:  None   Infection:  None   Code Status:  Prior    Ht:  160 cm (63\")   Wt:  154 kg (339 lb 11.2 oz)    Admission Cmt:  None   Principal Problem:  Edema [R60.9]                 Active Insurance as of 7/4/2020     Primary Coverage     Payor Plan Insurance Group Employer/Plan Group    HUMANA MEDICARE REPLACEMENT HUMANA MEDICARE REPLACEMENT [id number]     Payor Plan Address Payor Plan Phone Number Payor Plan Fax Number Effective Dates    [address] [address] [phone]  5/1/2020 - None Entered    [facility] [id number]       Subscriber Name Subscriber Birth Date Member ID       RAQUEL RIDLEY 1975 [id number]           Secondary Coverage     Payor Plan Insurance Group Employer/Plan Group    [employer]ORT [id number]     Payor Plan Address Payor Plan Phone Number Payor Plan Fax Number Effective Dates    [address] [phone]  10/1/2015 - None Entered    [facility] [address]       Subscriber Name Subscriber Birth Date Member ID       RAQUEL RIDLEY 1975 [id number]                 Emergency Contacts      (Rel.) Home Phone Work Phone Mobile Phone    Jose Manuel Joaquinina (Daughter) -- -- [phone]               History & Physical      ClydeElla PA-C at 07/04/20 2258     " "Attestation signed by Leelee Padgett DO at 20 0621    I have read the documentation below and agree. Patient seen and examined with Hui RN. Patient sleeping and has rested well after receiving Requip.    Patient obese; in no acute distress. Nasal cannula in place. Lungs with diminished but coarse breath sounds bilaterally. No obvious murmur. Difficult to palpate any inguinal lymph nodes due to body habitus.     Agree with holding on antibiotics for now. Cardiology consult for complaints of chest pain with recently normal stress test. Update echocardiogram. Will give a dose of diuretics now. Agree with inpatient general surgery consult for evaluation of inguinal lymphadenopathy, night sweats, concern for possible lymphoma.                        [facility]  HISTORY & PHYSICAL    Patient Identification:  Name:  Raquel Ridley  Age:  44 y.o.  Sex:  female  :  1975  MRN:  [id number]   Visit Number:  [id number]  Primary Care Physician:  Elsa Sharma APRN     Subjective     Chief complaint:   Chief Complaint   Patient presents with   • Chest Pain   • Shortness of Breath   • Bloated   • Leg Swelling     History of presenting illness:   Patient is a 44 y.o. female with past medical history significant for congestive heart failure, GERD, hyperlipidemia, essential hypertension, pseudotumor cerebri, restless leg syndrome, anxiety, morbid obesity, that presented to the [facility] emergency department for evaluation of generalized edema, shortness of breath, and chest pain.    It should be of note that the patient is able to provide history of presenting illness but is a poor historian.  She states that she has been experiencing generalized edema and \"smothering\" for around 4 days that has progressively worsened.  She admits to being seen at [facility] on several different occasions but was unsure of when they occurred.  Records from [place] [facility] were obtained and it appears that the patient was seen in the emergency department on 6/28/2020 for shortness of breath and was diagnosed with an acute COPD exacerbation.  It was recommended to the patient at that time that she be admitted to their hospitalist services but she declined.  The patient was once again seen on 6/29/2020 and was diagnosed with acute COPD exacerbation. On this presentation, the patient agreed to be admitted, however, she left AGAINST MEDICAL ADVICE once arriving to the floor.  She reports that she left [facility] because she was being tested for COVID and did not want to remain in isolation.  She reports that the COVID test was negative.  The patient states that her symptoms have not improved so she decided to come to the emergency room at [facility] for further evaluation.  She reports that her generalized edema and shortness of breath have worsened over the past 4 days and denies being on any antibiotics in the outpatient setting.  She did recently visit her primary care provider at some point last week (patient once again unsure of when) and states she was given p.o. prednisone, which she has been taking.  She states that ambulation makes the shortness of breath worse and she has recently been given oxygen to wear at home (2 L nasal cannula).  She further admits to chronic night sweats, unintentional weight loss (22 pounds over the past 2 weeks), chronic lymphadenopathy, orthopnea, palpitations, dizziness, lightheadedness, nausea, decreased sleep due to PTSD, and chest pain.  She reports that the chest pain began around 2 days ago and is located in her left shoulder with radiation down her left arm.  She states that the chest pain will wax and wane and occurs at rest.  She describes the pain as an ache and nothing seems to make it better or worse but it is associated with diaphoresis and nausea.  She denies a cardiac history and is unsure if anyone in her family  has ever had a heart attack.  She does admit to her paternal grandmother having liver and breast cancer.  The patient admits to smoking around 1 pack of cigarettes a day in addition to vaping.  She denies any illicit drug use or alcohol use.  The patient reports that has not consistently seen a primary care provider and states that she was supposed to have a biopsy of her lymph nodes done around 10 years ago which was never performed.    Upon arrival to the ED, vitals were temperature 99.3 °F, pulse 99, respirations 20, /69, SPO2 98% on 3 L nasal cannula.  Troponin T negative x2.  ABG with pH 7.468, PCO2 51.0, PO2 70.0, HCO3 36.9, O2 saturation 94.6 on 2 L nasal cannula.  CMP with glucose 102, potassium 3.3, CO2 34.7, chloride 92, anion gap 16.3, alkaline phosphatase 175.  Amylase 20, C-reactive protein 1.32, lactate 2.1, repeat 1.9, lipase 9.  CBC WBC 20.45, hemoglobin 11.8, MCH 25.3, MCHC 30.5.  Urinalysis unremarkable.  Blood cultures pending x2.  Chest x-ray shows mild increase in cardiac silhouette size central vascular and interstitial markings with prior study raising concern for mild volume overload, patchy airspace disease at the lung bases likely some patchy pulmonary edema.  CT of abdomen/pelvis without contrast shows lymphadenopathy in the retroperitoneum and extending into the bilateral inguinal regions, mild stranding of the mesenteric fat in general with some prominent mesenteric lymph nodes, extensive edema in the subcutaneous fat of the abdomen and most prominent anteriorly, no ascites, diffuse fatty infiltration of the liver, postcholecystectomy.  CTA chest without contrast shows prominent pericardial fat pads which account for the obscuration of the heart border on the plain film, cardiac silhouette size is normal, no acute appearing infiltrate, no acute congestive failure noted, small amount of dependent atelectasis.  Bilateral lower extremity venous ultrasound shows no convincing evidence  for acute appearing DVT, unable to visualize the peroneal veins or midportion posterior tibial veins or anterior tibial veins due to patient's morbid obesity, evidence for soft tissue edema.  EKG read by Dr. Coyne, cardiology, shows normal sinus rhythm, heart rate 90 bpm, QTc 440 MS.    In the emergency department the patient received IV Toradol 30 mg, IV Ativan 1 mg, IV morphine 4 mg, IV vancomycin, IV Zosyn.    Patient has been admitted to the telemetry floor for further evaluation and treatment.  ---------------------------------------------------------------------------------------------------------------------   Review of Systems   Constitutional: Positive for unexpected weight change (22 pounds over the past 2 weeks). Negative for chills, diaphoresis and fever.        Admits to night sweats   HENT: Negative for congestion and sore throat.    Eyes: Negative for discharge and visual disturbance.   Respiratory: Positive for shortness of breath (Worse with ambulation, orthopnea). Negative for cough and wheezing.    Cardiovascular: Positive for chest pain (Left shoulder with radiation down left arm) and leg swelling (Bilateral). Negative for palpitations.   Gastrointestinal: Positive for nausea. Negative for abdominal pain, constipation, diarrhea and vomiting.   Endocrine: Negative for polydipsia and polyuria.   Genitourinary: Negative for dysuria, frequency and urgency.   Musculoskeletal: Positive for arthralgias (Chronic leg pain). Negative for gait problem.   Skin: Negative for rash and wound.   Neurological: Positive for dizziness, light-headedness and headaches. Negative for syncope and numbness.   Hematological: Positive for adenopathy (Chronic inguinal). Does not bruise/bleed easily.   Psychiatric/Behavioral: Positive for sleep disturbance (Unable to sleep due to PTSD). Negative for confusion. The patient is not nervous/anxious.        ---------------------------------------------------------------------------------------------------------------------   Past Medical History:   Diagnosis Date   • Anxiety disorder 2020   • Arthritis    • CHF (congestive heart failure) (CMS/[facility])    • COPD (chronic obstructive pulmonary disease) (CMS/[facility])    • Diabetes mellitus (CMS/[facility])    • GERD (gastroesophageal reflux disease)    • Hyperlipidemia    • Hypertension    • Morbid obesity (CMS/[facility]) 2020   • Pseudotumor cerebri    • Restless leg    • Sleep apnea      Past Surgical History:   Procedure Laterality Date   •  SECTION     • CHOLECYSTECTOMY     • TONSILLECTOMY     • TUBAL ABDOMINAL LIGATION       Family History   Problem Relation Age of Onset   • Diabetes Mother    • Hypertension Mother    • Stroke Father    • Hypertension Father    • Diabetes Father    • Cancer Paternal Grandmother         breat and liver      Social History     Socioeconomic History   • Marital status: Legally      Spouse name: Not on file   • Number of children: Not on file   • Years of education: Not on file   • Highest education level: Not on file   Tobacco Use   • Smoking status: Current Every Day Smoker     Packs/day: 1.00     Types: Cigarettes, Electronic Cigarette   • Smokeless tobacco: Never Used   Substance and Sexual Activity   • Alcohol use: No     Frequency: Never   • Drug use: No   • Sexual activity: Defer     ---------------------------------------------------------------------------------------------------------------------   Allergies:  Erythromycin  ---------------------------------------------------------------------------------------------------------------------   Medications below are reported home medications pulling from within the system; at this time, these medications have not been reconciled unless otherwise specified and are in the verification process for further verifcation as current home medications.    Prior to Admission  Medications     Prescriptions Last Dose Informant Patient Reported? Taking?    amitriptyline (ELAVIL) 75 MG tablet  Pharmacy Yes No    Take 75 mg by mouth Every Night.    aspirin 81 MG EC tablet   No No    Take 1 tablet by mouth Daily.    atorvastatin (LIPITOR) 40 MG tablet   No No    Take 1 tablet by mouth Every Night.    buprenorphine-naloxone (SUBOXONE) 8-2 MG film film  Pharmacy Yes No    Place 2 films under the tongue Daily.    buPROPion SR (WELLBUTRIN SR) 150 MG 12 hr tablet  Pharmacy Yes No    Take 150 mg by mouth 2 (Two) Times a Day.    chlorthalidone (HYGROTON) 25 MG tablet  Pharmacy Yes No    Take 25 mg by mouth Daily.    CloNIDine (CATAPRES) 0.1 MG tablet  Pharmacy Yes No    Take 0.1 mg by mouth 2 (Two) Times a Day.    FLUoxetine (PROzac) 20 MG capsule  Pharmacy Yes No    Take 60 mg by mouth Daily.    losartan (COZAAR) 100 MG tablet  Pharmacy Yes No    Take 100 mg by mouth Daily.    omeprazole (priLOSEC) 20 MG capsule  Pharmacy Yes No    Take 20 mg by mouth Daily.    prazosin (MINIPRESS) 5 MG capsule  Pharmacy Yes No    Take 5 mg by mouth Every Night.    rOPINIRole (REQUIP) 3 MG tablet  Pharmacy Yes No    Take 3 mg by mouth 2 (Two) Times a Day As Needed.    tiZANidine (ZANAFLEX) 4 MG tablet  Pharmacy Yes No    Take 4 mg by mouth Every 8 (Eight) Hours As Needed for Muscle Spasms.    zolpidem (AMBIEN) 10 MG tablet  Pharmacy Yes No    Take 10 mg by mouth At Night As Needed for Sleep.        ---------------------------------------------------------------------------------------------------------------------    Objective     Hospital Scheduled Meds:    aspirin 324 mg Oral Once   aspirin 81 mg Oral Daily   atorvastatin 40 mg Oral Nightly   heparin (porcine) 5,000 Units Subcutaneous Q12H   pantoprazole 40 mg Oral Q AM   sodium chloride 10 mL Intravenous Q12H          Current listed hospital scheduled medications may not yet reflect those currently placed in orders that are signed and held, awaiting patient's  arrival to floor/unit.    ---------------------------------------------------------------------------------------------------------------------   Vital Signs:  Temp:  [98 °F (36.7 °C)-99.3 °F (37.4 °C)] 98 °F (36.7 °C)  Heart Rate:  [] 62  Resp:  [17-20] 20  BP: (105-135)/(62-79) 109/62  No data found.  SpO2 Percentage    07/04/20 2226 07/04/20 2332 07/04/20 2345   SpO2: 96% 93% 92%     SpO2:  [92 %-98 %] 92 %  on  Flow (L/min):  [2-3] 2;        Body mass index is 60.18 kg/m².  Wt Readings from Last 3 Encounters:   07/04/20 (!) 154 kg (339 lb 11.2 oz)   08/19/19 (!) 168 kg (370 lb)     ---------------------------------------------------------------------------------------------------------------------   Physical Exam:  Physical Exam   Constitutional: She is oriented to person, place, and time. She appears well-developed and well-nourished. She is cooperative. Nasal cannula in place.   Upon evaluation patient was sitting up in bed.  Appears to be restless/anxious, no acute distress noted.  2.5 L nasal cannula in place.   HENT:   Head: Normocephalic and atraumatic.   Nose: Nose normal. No nasal deformity.   Eyes: Conjunctivae and lids are normal. Right eye exhibits no discharge. Left eye exhibits no discharge. Right conjunctiva is not injected. Left conjunctiva is not injected.   Neck: No JVD present. Carotid bruit is not present.   Cardiovascular: Normal rate, regular rhythm, normal heart sounds, intact distal pulses and normal pulses. Exam reveals no gallop and no friction rub.   No murmur heard.  Pulses:       Popliteal pulses are 2+ on the right side, and 2+ on the left side.        Dorsalis pedis pulses are 2+ on the right side, and 2+ on the left side.   Pulmonary/Chest: Effort normal. No tachypnea and no bradypnea. No respiratory distress. She has decreased breath sounds (Diffuse). She has no wheezes. She has no rhonchi. She has no rales.   Abdominal: Soft. Normal appearance and bowel sounds are normal.  She exhibits no distension, no ascites and no mass. There is no tenderness. There is no rigidity and no guarding.   Increased body habitus   Musculoskeletal:        Right lower leg: She exhibits edema (1+).        Left lower leg: She exhibits edema (1+).     Vascular Status -  Her right foot exhibits normal foot vasculature  and no edema. Her left foot exhibits normal foot vasculature  and no edema.  Skin Integrity  -  Her right foot skin is intact.Her left foot skin is intact..  Lymphadenopathy:        Head (right side): No submental, no submandibular, no tonsillar, no preauricular, no posterior auricular and no occipital adenopathy present.        Head (left side): No submental, no submandibular, no tonsillar, no preauricular, no posterior auricular and no occipital adenopathy present.     She has no cervical adenopathy.        Right cervical: No superficial cervical adenopathy present.       Left cervical: No superficial cervical adenopathy present.     She has no axillary adenopathy.        Right: No inguinal and no supraclavicular adenopathy present.        Left: Inguinal (Possible mild lymphadenopathy, difficult to determine due to patient's increased body habitus) adenopathy present. No supraclavicular adenopathy present.   Neurological: She is alert and oriented to person, place, and time. She has normal strength. She is not disoriented (Alert and oriented to herself, place, month, year). She displays no atrophy. No sensory deficit.   Skin: No abrasion and no petechiae noted. No erythema.   Psychiatric: Her speech is normal. Her mood appears anxious. She is agitated. Cognition and memory are normal. Cognition and memory are not impaired. She does not exhibit a depressed mood.     ---------------------------------------------------------------------------------------------------------------------  EKG:    Read by Dr. Coyne, normal sinus rhythm, heart rate 90 bpm, QTc 440 MS.    Telemetry:    Normal sinus  rhythm, heart rate 70 bpm SPO2 90% on 2.5 L nasal cannula.    I have personally reviewed the EKG/Telemetry strip  ---------------------------------------------------------------------------------------------------------------------   Results from last 7 days   Lab Units 07/04/20  1801 07/04/20  1459   TROPONIN T ng/mL <0.010 <0.010     Results from last 7 days   Lab Units 07/04/20  1459   PROBNP pg/mL 94.9       Results from last 7 days   Lab Units 07/04/20  1606   PH, ARTERIAL pH units 7.468*   PO2 ART mm Hg 70.0*   PCO2, ARTERIAL mm Hg 51.0*   HCO3 ART mmol/L 36.9*     Results from last 7 days   Lab Units 07/04/20  1910 07/04/20  1459   CRP mg/dL  --  1.22*   LACTATE mmol/L 1.9 2.1*   WBC 10*3/mm3  --  20.45*   HEMOGLOBIN g/dL  --  11.8*   HEMATOCRIT %  --  38.7   MCV fL  --  82.9   MCHC g/dL  --  30.5*   PLATELETS 10*3/mm3  --  421     Results from last 7 days   Lab Units 07/04/20  1459   SODIUM mmol/L 143   POTASSIUM mmol/L 3.3*   CHLORIDE mmol/L 92*   CO2 mmol/L 34.7*   BUN mg/dL 12   CREATININE mg/dL 0.85   EGFR IF NONAFRICN AM mL/min/1.73 73   CALCIUM mg/dL 9.0   GLUCOSE mg/dL 102*   ALBUMIN g/dL 4.07   BILIRUBIN mg/dL 0.3   ALK PHOS U/L 175*   AST (SGOT) U/L 15   ALT (SGPT) U/L 18   Estimated Creatinine Clearance: 124 mL/min (by C-G formula based on SCr of 0.85 mg/dL).  No results found for: AMMONIA    No results found for: HGBA1C, POCGLU  No results found for: HGBA1C  Lab Results   Component Value Date    TSH 3.550 08/19/2019       No results found for: BLOODCX  No results found for: URINECX  No results found for: WOUNDCX  No results found for: STOOLCX  No results found for: RESPCX  No results found for: MRSACX  Pain Management Panel     There is no flowsheet data to display.        I have personally reviewed the above laboratory results.   ---------------------------------------------------------------------------------------------------------------------  Imaging Results (Last 7 Days)     Procedure  Component Value Units Date/Time    US Venous Doppler Lower Extremity Bilateral (duplex) [[id number]] Collected:  07/04/20 1900     Updated:  07/04/20 1902    Narrative:       US Veins LE Duplex BILAT    HISTORY:   Swelling redness and pain bilateral lower extremities. Morbid obesity. ER evaluation.    TECHNIQUE:   Real-time ultrasound was performed of both lower extremities utilizing spectral and color Doppler with compression and augmentation techniques.    COMPARISON:  Doppler venous lower extremity bilateral from 8/19/2019.    FINDINGS:  Right Lower Extremity:  There is no deep venous thrombus seen in the right lower extremity, including the right common femoral veins, right femoral veins and right popliteal veins.  Normal compressibility and respiratory phasicity was visualized.  No calf vein thrombus.    Left Lower Extremity:  There is no deep venous thrombus seen in the left lower extremity, including the left common femoral veins, left femoral veins and left popliteal veins.   Normal compressibility and respiratory phasicity was visualized.  No calf vein thrombus.    Technologist notes that they were unable to visualize the peroneal veins or mid posterior tibial veins on either side due to the patient's morbid obesity and edema. This is a limited exam.        Impression:       Limited evaluation of the bilateral lower extremity deep venous system shows no convincing evidence for acute appearing deep venous thrombosis. Technologist was unable to visualize the peroneal veins or mid portion posterior tibial veins or anterior  tibial veins secondary to the patient's morbid obesity. There is evidence for soft tissue edema.    Signer Name: Veronica Cardozo MD   Signed: 7/4/2020 7:00 PM   Workstation Name: [email or web address]    [facility]    CT Abdomen Pelvis Without Contrast [[id number]] Collected:  07/04/20 1837     Updated:  07/04/20 1839    Narrative:       INDICATION:   Abdominal swelling. ER  evaluation.    TECHNIQUE:   CT of the abdomen and pelvis without contrast. Coronal and sagittal reconstructions were obtained.  Radiation dose reduction techniques included automated exposure control or exposure modulation based on body size. Radiation audit for number of CT and  nuclear cardiology exams performed in the last year: 2.      COMPARISON:   See the accompanying chest CT dictation.    FINDINGS:  Lung bases: 80 accounting chest CT dictation.    Abdomen: Lack of IV contrast media limits the study.    There is a large amount of soft tissue edema seen in the anterior, lateral, and posterior abdominal subcutaneous fat .    There is mild diffuse fatty infiltration of liver. The spleen is unremarkable. The patient is postcholecystectomy. The pancreas is largely fatty replaced. The adrenal glands are unremarkable. There is no evidence for intrarenal calculus or  hydronephrosis.    There is no evidence for bowel obstruction. Appendix is unremarkable.    There is lymphadenopathy in the retroperitoneum and inguinal regions bilaterally. There our smaller mesenteric lymph nodes and stranding in the mesentery in general. This should be further evaluated. Correlation with an abdomen pelvis CT scan with IV and  oral contrast media recommended. Please correlate for any clinical concern for lymphoma. Lymphadenopathy is nonspecific at this time. I other neoplastic, infectious, or inflammatory disease is also the differential. Tissue diagnosis may be helpful.  Largest left inguinal node is about 1.3 x 2 cm dimension. Largest right inguinal node is about 1.5 x 2 cm dimension. There is a left para-aortic node just below the left renal artery at its about 1.9 x 1.5 cm dimension. There is a right common  Iliac chain node that is about 1.3 cm in diameter. The right external iliac chain lymph node that is about 1.3 cm short axis dimension.    There is degenerative change in the spine.    Pelvis: Bladder is unremarkable. Uterus  and adnexal structures are unremarkable.      Impression:         1. There is lymphadenopathy in the retroperitoneum and extending into the bilateral inguinal regions. There is also mild stranding of the mesenteric fat in general with some prominent mesenteric lymph nodes. This should be further evaluated. Please  correlate for any clinical history of or evidence for lymphoma. Other neoplastic infectious or inflammatory diseases are all in the differential.  2. There is extensive edema in the subcutaneous fat of the abdomen most prominent anteriorly.  3. There is no ascites. There is diffuse fatty infiltration of the liver.  4. Postcholecystectomy.  5. Study is limited by lack of IV and oral contrast media. Follow-up study with both oral and IV contrast media may be helpful.        Signer Name: Veronica Cardozo MD   Signed: 7/4/2020 6:37 PM   Workstation Name: [facility]    [facility]    CT Chest Without Contrast [[id number]] Collected:  07/04/20 1826     Updated:  07/04/20 1828    Narrative:       INDICATION:    Chest pain and short of breath ER evaluation    TECHNIQUE:   CT of the chest without contrast. Coronal and sagittal reconstructions were obtained.  Radiation dose reduction techniques included automated exposure control or exposure modulation based on body size. Radiation audit for number of CT and nuclear  cardiology exams performed in the last year: 2.      COMPARISON:    Earlier chest x-ray and CT chest PE protocol from 8/19/2019.    FINDINGS:  Study is limited by lack of IV contrast media. There is greater than no axillary mediastinal or definite hilar adenopathy allowing for the lack of IV contrast media. There is no pleural or pericardial effusion. Heart size is within normal limits. There  is a prominent right and left anterior pericardial fat pad which results in the obscuration of the heart border on the frontal view and account for the abnormalities on the plain film. There is  no evidence for acute infiltrate. There is a small amount of  dependent atelectasis.    See separate abdomen pelvis CT dictation. There is a small sclerotic lesion in the sternum which is stable and likely a bone island.      Impression:         1. This patient has prominent pericardial fat pads which account for the obscuration of the heart border on the plain film. Cardiac silhouette size is normal and there is no acute-appearing infiltrate. No acute congestive failure appreciated. There is a  small amount of dependent atelectasis.  2. Please refer the separate abdomen pelvis CT dictation.    Signer Name: Veronica Cardozo MD   Signed: 7/4/2020 6:26 PM   Workstation Name: Psychiatric    [facility]    XR Chest 1 View [[id number]] Collected:  07/04/20 1555     Updated:  07/04/20 1557    Narrative:       CR Chest 1 Vw    INDICATION:   Chest pain short of breath     COMPARISON:    Chest x-ray 8/19/2019.    FINDINGS:  Single portable AP view(s) of the chest.  Cardiac silhouette is mildly enlarged and increased from prior. There is subtle increase in central vascular and interstitial markings from prior with development of patchy bibasilar airspace disease. Please  correlate for clinical evidence of subtle volume overload. No obvious pleural effusion. No pneumothorax.      Impression:       Mild increase in cardiac silhouette size central vascular and interstitial markings as prior study raising concern for mild volume overload. Patchy airspace disease at the lung bases likely some patchy pulmonary edema. Follow-up recommended to ensure  resolution    Signer Name: Veronica Cardozo MD   Signed: 7/4/2020 3:55 PM   Workstation Name: Psychiatric    [facility]        I have personally reviewed the above radiology results.      ---------------------------------------------------------------------------------------------------------------------    Assessment & Plan      [facility]  Problems    Diagnosis POA   • **Edema [R60.9] Yes   • Morbid obesity (CMS/HCC) [E66.01] Yes   • CHF (congestive heart failure) (CMS/HCC) [I50.9] Yes   • GERD (gastroesophageal reflux disease) [K21.9] Yes   • Anxiety disorder [F41.9] Yes   • Hyperlipidemia [E78.5] Yes   • Essential hypertension [I10] Yes   • Pseudotumor cerebri [G93.2] Yes   • Restless leg [G25.81] Yes     · SIRs criteria met upon admission due to unknown etiology at this time: Technically meets severe sepsis criteria with pulse 100, respirations 20, WBC 20.45, lactate 2.1, however, repeat lactate is 1.9 and no source of infection.  Questionable as to whether leukocytosis is due to p.o. steroids received in the outpatient setting.  Skin exam revealed no evidence of cellulitis.  CT of abdomen/pelvis shows extensive edema in the subcutaneous fat and lymphadenopathy in the bilateral inguinal regions but no obvious source of infection.  CT of chest also shows no consolidation.  Chest x-ray unremarkable as well.  UA unremarkable.  Patient received IV vancomycin and IV Zosyn in the emergency department.  We will hold on further antibiotics at this time.  Blood cultures currently pending x2.  Repeat a.m. CBC, lactate, CRP.  Monitor vitals closely.  Currently afebrile, continue monitor temperature curve.     · Bilateral inguinal lymphadenopathy with reported history of chronic lymphadenopathy: Upon palpation there was some mild left-sided inguinal lymphadenopathy, however, difficult to determine due to the patient's increased body habitus.  CT of the abdomen/pelvis did reveal left inguinal node measuring 1.3 x 2 cm, right inguinal node 1.5 x 2 cm, left para-aortic node just below the left renal artery measuring 1.9 x 1.5 cm, right common iliac chain measuring 1.3 cm in diameter and right external iliac chain measuring 1.3 cm in dimension.  She reports that she has a history of chronic lymphadenopathy was supposed to have a biopsy around 10 years ago that was  never performed.  We will consult general surgery for possible biopsy, input/assistance is much appreciated.  Patient is to be n.p.o.  CBC is unremarkable.  Repeat a.m. CBC.  Continue to monitor closely.    · Chest pain with mixed features: Currently chest pain-free.  EKG shows no acute ischemic changes.  Troponin negative x2.  We will continue to trend troponin and obtain serial EKGs.  Patient had a stress test in August 2019 that was low risk.  Transthoracic echo has been ordered.  Cardiology consulted, input/assistance is much appreciated.  Give loading dose aspirin now and continue with daily aspirin.  Continue patient's home Lipitor 40 mg nightly.  Fasting a.m. lipid panel ordered.    · Mild hypokalemia: 3.3 on admission.  Will obtain repeat potassium level.  If still hypokalemic, potassium replacement protocol will be ordered.    · Elevated alkaline phosphatase suspect 2/2 to fatty infiltration of the liver: Continue patient's home Lipitor 40 mg nightly once reconciled per pharmacy.  Repeat a.m. CMP.    · Microcytic anemia: H&H stable.  Repeat a.m. CBC and monitor H&H closely.  Obtain iron, folate, ferritin, B12, iron panel.  Replace as necessary.  If hemoglobin less than 7 will transfuse.    · Chronic hypoxia with acute hypercarbia that is compensated: Repeat a.m. ABG.  Currently on 2.5 L nasal cannula.  Continue with supplemental oxygen PRN, titrate to maintain SPO2 greater than or equal to 90%.    · Congestive heart failure: CT of chest shows no acute congestive heart failure.  Chest x-ray shows some patchy airspace disease at the lung bases, likely patchy pulmonary edema.  Does not appear decompensated at this time.  No transthoracic echo on file.  Echo has been ordered.  Will hold on diuresis for now.  Monitor urine output with strict I's and O's.  Obtain daily weights.    · Patient reported history of type 2 diabetes mellitus: Current home medication list does not show any antihyperglycemics.  Glucose on  admission 102.  Hemoglobin A1c ordered.    · Obesity hypoventilation syndrome: Supplemental oxygen PRN.  Titrate to maintain SPO2 greater than or equal to 90%.    · Essential hypertension: BP has been borderline hypotensive in ED. We will hold home antihypertensive medications for now. Continue to monitor BP per hospital protocol.     · Hyperlipidemia: Fasting a.m. lipid panel ordered. Continue home Lipitor 40 mg nightly.     · History of pseudotumor cerebri: Continue outpatient follow up with PCP.     · Restless leg syndrome: continue home Requip once reconciled per pharmacy.     · Anxiety: Continue home medications once reconciled per pharmacy. Supportive care.     · GERD: Protonix     · ? History of substance abuse: Britton reviewed. Patient appears to be prescribed Suboxone. UDS ordered. Plan to continue Suboxone once reconciled per pharmacy.     Smoking tobacco use: Raquel Ridley  reports that she has been smoking cigarettes and electronic cigarette. She has been smoking about 1.00 pack per day. She has never used smokeless tobacco.. I have educated her on the risk of diseases from using tobacco products such as cancer, COPD and heart diease. I advised her to quit and she is not willing to quit. I spent 4 minutes counseling the patient. Nicotine patch ordered.     · Morbid obesity: BMI 60.18 kg/m2     · F/E/N: No IV fluids.  Replace electrolytes as necessary.  N.p.o.    ---------------------------------------------------  DVT Prophylaxis: Subcutaneous heparin  GI Prophylaxis: Protonix   Activity: Up with assistance, fall precautions     The patient is considered to be a high risk patient due to: generalized edema, severe sepsis present upon admission due to unknown etiology, bilateral inguinal lymphadenopathy     INPATIENT status due to the need for care which can only be reasonably provided in an hospital setting such as aggressive/expedited ancillary services and/or consultation services, the necessity for IV  medications, close physician monitoring and/or the possible need for procedures.  In such, I feel patient’s risk for adverse outcomes and need for care warrant INPATIENT evaluation and predict the patient’s care encounter to likely last beyond 2 midnights.    Code Status: FULL CODE     I have discussed the patient's assessment and plan with the patient, nursing staff, and attending physician      Ella Tang PA-C  Hospitalist Service --        07/05/20  02:09    Attending Physician: Leelee Padgett DO       Electronically signed by Leelee Padgett DO at 07/05/20 0621          Emergency Department Notes      Elsa Rendon, RN at 07/04/20 1420        Patient's daughter, Janessa Patterson, called for update on patient. Advised daughter nurse is in room with patient at this time. Daughter states patient was seen at [facility] approx 4 days ago. She states they wanted to admit her to the hospital for CHF, but she did not want to stay. She states patient's shortness of breath has worsened over the past two days. She reports hx of CHF, COPD, mitral valve. She states patient tested negative for COVID at [facility].     Elsa Rendon RN  07/04/20 1518      Electronically signed by Elsa Rendon RN at 07/04/20 1518     aFby Vincent at 07/04/20 1519        Medical request form sent to [facility].     Faby Vincent  07/04/20 1520      Electronically signed by Faby Vincent at 07/04/20 1520     Kayce Friedman at 07/04/20 2000        Called [facility] supervisor. Left voicemail requesting records from 4/2/20 be faxed.     Kayce Friedman  07/04/20 2004      Electronically signed by Kayce Friedman at 07/04/20 2004     Collette, Ashley N, RN at 07/04/20 1202        Called report to [facility]. Spoke with FRAN Izquierdo. Skin WDL. IV patent. Patient alert and oriented and aware of admission.      Collette, Ashley N,  RN  20 [phone]      Electronically signed by Collette, Ashley N, RN at 20 [phone]     Matilda Sung APRN at 20 0115          Subjective   Patient presents to the ER with multiple complaints.  She reports that over the last two weeks she has gained over 25lbs in fluid.  She was suppose to be admitted at [facility] a couple of days ago for CHF but she signed out AMA.  She started having more difficulty breathing as she started to swell more.           Review of Systems   Constitutional: Negative.  Negative for fever.   HENT: Negative.    Respiratory: Negative.    Cardiovascular: Negative.  Negative for chest pain.   Gastrointestinal: Negative.  Negative for abdominal pain.   Endocrine: Negative.    Genitourinary: Negative.  Negative for dysuria.   Skin: Negative.    Neurological: Negative.    Psychiatric/Behavioral: Negative.    All other systems reviewed and are negative.      Past Medical History:   Diagnosis Date   • Anxiety disorder 2020   • Arthritis    • CHF (congestive heart failure) (CMS/[facility])    • COPD (chronic obstructive pulmonary disease) (CMS/[facility])    • Diabetes mellitus (CMS/[facility])    • GERD (gastroesophageal reflux disease)    • Hyperlipidemia    • Hypertension    • Morbid obesity (CMS/[facility]) 2020   • Pseudotumor cerebri    • Restless leg    • Sleep apnea        Allergies   Allergen Reactions   • Erythromycin Rash       Past Surgical History:   Procedure Laterality Date   •  SECTION     • CHOLECYSTECTOMY     • TONSILLECTOMY     • TUBAL ABDOMINAL LIGATION         History reviewed. No pertinent family history.    Social History     Socioeconomic History   • Marital status: Legally      Spouse name: Not on file   • Number of children: Not on file   • Years of education: Not on file   • Highest education level: Not on file   Tobacco Use   • Smoking status: Never Smoker   • Smokeless tobacco: Never Used   • Tobacco comment: uses a vapor   Substance and Sexual Activity    • Alcohol use: No     Frequency: Never   • Drug use: No   • Sexual activity: Defer           Objective   Physical Exam   Constitutional: She is oriented to person, place, and time.   Morbidly obese    HENT:   Head: Normocephalic and atraumatic.   Right Ear: External ear normal.   Left Ear: External ear normal.   Nose: Nose normal.   Eyes: Pupils are equal, round, and reactive to light. Conjunctivae and EOM are normal.   Neck: Normal range of motion. Neck supple. No JVD present. No tracheal deviation present.   Cardiovascular: Normal rate, regular rhythm and normal heart sounds.   No murmur heard.  Pulmonary/Chest: Effort normal and breath sounds normal. No respiratory distress. She has no wheezes.   Abdominal: Soft. Bowel sounds are normal. There is tenderness.   Musculoskeletal: Normal range of motion. She exhibits no edema or deformity.   Neurological: She is alert and oriented to person, place, and time. No cranial nerve deficit.   Skin: Skin is warm and dry. No rash noted. She is not diaphoretic. There is erythema. No pallor.   Bilateral +2 pitting edema in lower extremities with associated erythema.    Psychiatric: She has a normal mood and affect. Her behavior is normal. Thought content normal.   Nursing note and vitals reviewed.      Procedures          ED Course  ED Course as of Jul 05 0121   Sat Jul 04, 2020 2104 I have personally seen and evaluated this patient and agree with Matilda's plan, documentation.  The patient is currently resting calmly with no complaints    [EG]      ED Course User Index  [EG] Mady Sarmiento,                                            MDM  Number of Diagnoses or Management Options  Cellulitis of lower extremity, unspecified laterality: new and requires workup  Risk of Complications, Morbidity, and/or Mortality  Presenting problems: moderate  Diagnostic procedures: moderate  Management options: moderate        Final diagnoses:   Cellulitis of lower extremity, unspecified  laterality            Matilda Sung APRN  07/05/20 0121      Electronically signed by Matilda Sung APRN at 07/05/20 0121       Physician Progress Notes (last 72 hours) (Notes from 07/03/20 0952 through 07/06/20 0952)    No notes of this type exist for this encounter.            Consult Notes (last 72 hours) (Notes from 07/03/20 0952 through 07/06/20 0952)      David He MD at 07/05/20 0842      Consult Orders    1. Inpatient Cardiology Consult [[id number]] ordered by Ella Tang PA-C at 07/05/20 0204                Date of Admit: 7/4/2020  Date of Consult: 07/05/20  Mady Sarmiento,         Edema    Morbid obesity (CMS/[facility])    CHF (congestive heart failure) (CMS/[facility])    GERD (gastroesophageal reflux disease)    Anxiety disorder    Hyperlipidemia    Essential hypertension    Pseudotumor cerebri    Restless leg      Assessment      2. Chest pain,atypical,  troponin negative, EKG tracing reviewed and shows no acute ischemia  3. Hyperlipidemia, on statin  4. Hypokalemia  5. Tobacco abuse  6. Morbid obesity    Recommendations     2. Chest pain  · Ruled out for MI.  Chest pain is atypical.  Troponin negative and EKG without acute ischemia.  Echocardiogram showed normal EF with no wall motion abnormalities.  · Continue with aspirin and Lipitor.  · Patient does not appear to be in CHF.   · No further cardiac work-up warranted at this time.  · She does have unintentional weight loss, with significant retroperitoneal lymphadenopathy concerning for lymphoma which would need further evaluation .  · Will sign off , please call for any questions.         Reason for consultation: Chest pain     Subjective       Subjective     History of Present Illness     Raquel Ridley is a 44-year-old female with a past medical history significant for hyperlipidemia, essential hypertension, anxiety, morbid obesity and pseudotumor cerebri.  Patient states that for the last couple weeks she has been experiencing  intermittent chest pains and shortness of breath.  She describes the pain as a pressure/tightness in the middle of her chest that radiates to her left shoulder.  She denies any alleviating or aggravating factors.  The pain comes at random.  She denies any associated nausea or diaphoresis.  She also has had worsening edema in the lower extremities and abdomen with unintentional weight loss.  She reports that she was seen at [facility] on several different occasions in June.  Per chart review she was seen in the ER on June 28 for shortness of breath and was diagnosed with acute COPD exacerbation and it was recommended that the patient be admitted but she declined.  She was seen again on 6/29/2020 and was diagnosed with acute COPD exacerbation and agreed to be admitted however she left AGAINST MEDICAL ADVICE once arriving to the floor.  She denies any history of coronary artery disease.  She is a smoker and smokes about 1 pack of cigarettes a day in addition to vaping.  She reports taking all her medications regularly.    Cardiac risk factors:diabetes mellitus, hypercholesterolemia, hypertension, smoking, Sedentary life style and Obesity    Last Stress: 8/20/2019  · Left ventricular ejection fraction is normal (Calculated EF = 57%).  · Myocardial perfusion imaging indicates a normal myocardial perfusion study with no evidence of ischemia.  · Impressions are consistent with a low risk study.  · Findings consistent with a normal ECG stress test.    Past Medical History:   Diagnosis Date   • Anxiety disorder 7/4/2020   • Arthritis    • CHF (congestive heart failure) (CMS/[facility])    • COPD (chronic obstructive pulmonary disease) (CMS/[facility])    • Diabetes mellitus (CMS/[facility])    • GERD (gastroesophageal reflux disease)    • Hyperlipidemia    • Hypertension    • Morbid obesity (CMS/[facility]) 7/4/2020   • Pseudotumor cerebri    • Restless leg    • Sleep apnea      Past Surgical History:   Procedure Laterality Date   •   SECTION     • CHOLECYSTECTOMY     • TONSILLECTOMY     • TUBAL ABDOMINAL LIGATION       Family History   Problem Relation Age of Onset   • Diabetes Mother    • Hypertension Mother    • Stroke Father    • Hypertension Father    • Diabetes Father    • Cancer Paternal Grandmother         breat and liver      Social History     Tobacco Use   • Smoking status: Current Every Day Smoker     Packs/day: 1.00     Types: Cigarettes, Electronic Cigarette   • Smokeless tobacco: Never Used   Substance Use Topics   • Alcohol use: No     Frequency: Never   • Drug use: No     Medications Prior to Admission   Medication Sig Dispense Refill Last Dose   • amitriptyline (ELAVIL) 75 MG tablet Take 75 mg by mouth Every Night.   2019 at Unknown time   • aspirin 81 MG EC tablet Take 1 tablet by mouth Daily. 30 tablet 0    • atorvastatin (LIPITOR) 40 MG tablet Take 1 tablet by mouth Every Night. 30 tablet 0    • buprenorphine-naloxone (SUBOXONE) 8-2 MG film film Place 2 films under the tongue Daily.   2019 at 0700   • buPROPion SR (WELLBUTRIN SR) 150 MG 12 hr tablet Take 150 mg by mouth 2 (Two) Times a Day.   2019 at 0700   • chlorthalidone (HYGROTON) 25 MG tablet Take 25 mg by mouth Daily.   2019 at 0700   • CloNIDine (CATAPRES) 0.1 MG tablet Take 0.1 mg by mouth 2 (Two) Times a Day.   2019 at 0700   • FLUoxetine (PROzac) 20 MG capsule Take 60 mg by mouth Daily.   2019 at 0700   • losartan (COZAAR) 100 MG tablet Take 100 mg by mouth Daily.   2019 at 0700   • omeprazole (priLOSEC) 20 MG capsule Take 20 mg by mouth Daily.   2019 at 0700   • prazosin (MINIPRESS) 5 MG capsule Take 5 mg by mouth Every Night.   2019 at Unknown time   • rOPINIRole (REQUIP) 3 MG tablet Take 3 mg by mouth 2 (Two) Times a Day As Needed.   2019 at 0700   • tiZANidine (ZANAFLEX) 4 MG tablet Take 4 mg by mouth Every 8 (Eight) Hours As Needed for Muscle Spasms.   2019 at 0700   • zolpidem (AMBIEN) 10 MG  tablet Take 10 mg by mouth At Night As Needed for Sleep.   8/18/2019 at Unknown time     Allergies:  Erythromycin    Review of Systems   Constitutional: Positive for fatigue and unexpected weight change. Negative for chills, diaphoresis and fever.   HENT: Negative for congestion and trouble swallowing.    Eyes: Negative for photophobia and visual disturbance.   Respiratory: Positive for chest tightness, shortness of breath and wheezing.    Cardiovascular: Positive for chest pain, palpitations and leg swelling.   Gastrointestinal: Negative for abdominal pain, nausea and vomiting.   Endocrine: Negative for polyphagia and polyuria.   Genitourinary: Negative for dysuria and hematuria.   Musculoskeletal: Negative for neck pain and neck stiffness.   Skin: Negative for rash and wound.   Allergic/Immunologic: Negative for food allergies and immunocompromised state.   Neurological: Positive for dizziness and weakness. Negative for light-headedness.   Hematological: Does not bruise/bleed easily.   Psychiatric/Behavioral: Positive for sleep disturbance. Negative for confusion and suicidal ideas.       Objective       Objective      Vital Signs  Temp:  [98 °F (36.7 °C)-99.3 °F (37.4 °C)] 98 °F (36.7 °C)  Heart Rate:  [] 64  Resp:  [17-20] 20  BP: (105-135)/(62-79) 118/64     Vital Signs (last 72 hrs)       07/02 0700  -  07/03 0659 07/03 0700  -  07/04 0659 07/04 0700  -  07/05 0659 07/05 0700  -  07/05 0842   Most Recent    Temp (°F)     98 -  99.3       98 (36.7)    Heart Rate     62 -  100       64    Resp     17 -  20       20    BP     105/71 -  135/69       118/64    SpO2 (%)     92 -  98      97     97        Body mass index is 60.18 kg/m².  No intake or output data in the 24 hours ending 07/05/20 0842  Physical Exam   Constitutional: She is oriented to person, place, and time. She appears well-developed and well-nourished.   Obese   HENT:   Head: Normocephalic and atraumatic.   Neck: Normal range of motion.    Cardiovascular: Normal rate, regular rhythm and normal heart sounds.   No murmur heard.  Pulmonary/Chest: Effort normal. No respiratory distress. She has no wheezes.   Decreased breath sounds    Abdominal: Soft. Bowel sounds are normal. She exhibits no distension. There is no tenderness.   Musculoskeletal: She exhibits edema (1+ BLE).   Neurological: She is alert and oriented to person, place, and time.   Psychiatric: She has a normal mood and affect. Her behavior is normal.     Results review     Results Review:    I reviewed the patient's new clinical results.  Results from last 7 days   Lab Units 07/05/20  0721 07/05/20  0303 07/04/20  1801 07/04/20  1459   TROPONIN T ng/mL <0.010 <0.010 <0.010 <0.010     Results from last 7 days   Lab Units 07/05/20  0721 07/04/20  1459   WBC 10*3/mm3 10.39 20.45*   HEMOGLOBIN g/dL 11.4* 11.8*   PLATELETS 10*3/mm3 344 421     Results from last 7 days   Lab Units 07/05/20  0721 07/05/20  0303 07/04/20  1459   SODIUM mmol/L 140  --  143   POTASSIUM mmol/L 3.1* 3.8 3.3*   CHLORIDE mmol/L 93*  --  92*   CO2 mmol/L 34.1*  --  34.7*   BUN mg/dL 14  --  12   CREATININE mg/dL 0.84  --  0.85   CALCIUM mg/dL 8.1*  --  9.0   GLUCOSE mg/dL 116*  --  102*   ALT (SGPT) U/L 17  --  18   AST (SGOT) U/L 17  --  15     Lab Results   Component Value Date    INR 0.95 08/19/2019     Lab Results   Component Value Date    MG 1.3 (L) 07/05/2020     Lab Results   Component Value Date    TSH 4.700 (H) 07/05/2020    TRIG 241 (H) 07/05/2020    HDL 40 07/05/2020    LDL 81 07/05/2020      Lab Results   Component Value Date    PROBNP 94.9 07/04/2020    PROBNP <5.0 (L) 08/20/2019    PROBNP 21.2 08/19/2019       ECG  ECG/EMG Results (last 24 hours)     Procedure Component Value Units Date/Time    ECG 12 Lead [[id number]] Collected:  07/04/20 1417     Updated:  07/04/20 1510    Narrative:       Test Reason : chest pain, shortness of breath  Blood Pressure : **/** mmHG  Vent. Rate : 090 BPM     Atrial Rate : 090  BPM     P-R Int : 134 ms          QRS Dur : 080 ms      QT Int : 360 ms       P-R-T Axes : 044 054 032 degrees     QTc Int : 440 ms    Normal sinus rhythm  Nonspecific ST and T wave abnormality  Abnormal ECG  When compared with ECG of 20-AUG-2019 06:38,  No significant change was found  Confirmed by David He (2020) on 7/4/2020 3:10:17 PM    Referred By:  CURD           Confirmed By:David He    ECG 12 Lead [[id number]] Collected:  07/05/20 0247     Updated:  07/05/20 0248    Narrative:       Test Reason : chest pain  Blood Pressure : **/** mmHG  Vent. Rate : 075 BPM     Atrial Rate : 075 BPM     P-R Int : 150 ms          QRS Dur : 080 ms      QT Int : 440 ms       P-R-T Axes : 053 066 050 degrees     QTc Int : 491 ms    Normal sinus rhythm  Prolonged QT  Abnormal ECG  When compared with ECG of 04-JUL-2020 14:17,  QT has lengthened    Referred By:  AMARJIT           Confirmed By:     ECG 12 Lead [[id number]] Collected:  07/05/20 0701     Updated:  07/05/20 0702    Narrative:       Test Reason : chest pain  Blood Pressure : **/** mmHG  Vent. Rate : 070 BPM     Atrial Rate : 070 BPM     P-R Int : 142 ms          QRS Dur : 080 ms      QT Int : 420 ms       P-R-T Axes : 052 071 057 degrees     QTc Int : 453 ms    Normal sinus rhythm  Normal ECG  When compared with ECG of 05-JUL-2020 02:47, (Unconfirmed)  No significant change was found    Referred By:  JURGEN           Confirmed By:     Adult Transthoracic Echo Complete W/ Cont if Necessary Per Protocol [[id number]] Resulted:  07/05/20 0808     Updated:  07/05/20 0808          Imaging Results (Last 72 Hours)     Procedure Component Value Units Date/Time    US Venous Doppler Lower Extremity Bilateral (duplex) [[id number]] Collected:  07/04/20 1900     Updated:  07/04/20 1902    Narrative:       US Veins LE Duplex BILAT    HISTORY:   Swelling redness and pain bilateral lower extremities. Morbid obesity. ER evaluation.    TECHNIQUE:   Real-time ultrasound was  performed of both lower extremities utilizing spectral and color Doppler with compression and augmentation techniques.    COMPARISON:  Doppler venous lower extremity bilateral from 8/19/2019.    FINDINGS:  Right Lower Extremity:  There is no deep venous thrombus seen in the right lower extremity, including the right common femoral veins, right femoral veins and right popliteal veins.  Normal compressibility and respiratory phasicity was visualized.  No calf vein thrombus.    Left Lower Extremity:  There is no deep venous thrombus seen in the left lower extremity, including the left common femoral veins, left femoral veins and left popliteal veins.   Normal compressibility and respiratory phasicity was visualized.  No calf vein thrombus.    Technologist notes that they were unable to visualize the peroneal veins or mid posterior tibial veins on either side due to the patient's morbid obesity and edema. This is a limited exam.        Impression:       Limited evaluation of the bilateral lower extremity deep venous system shows no convincing evidence for acute appearing deep venous thrombosis. Technologist was unable to visualize the peroneal veins or mid portion posterior tibial veins or anterior  tibial veins secondary to the patient's morbid obesity. There is evidence for soft tissue edema.    Signer Name: Veronica Cardozo MD   Signed: 7/4/2020 7:00 PM   Workstation Name: [email or web address]    [facility]    CT Abdomen Pelvis Without Contrast [[id number]] Collected:  07/04/20 1837     Updated:  07/04/20 1839    Narrative:       INDICATION:   Abdominal swelling. ER evaluation.    TECHNIQUE:   CT of the abdomen and pelvis without contrast. Coronal and sagittal reconstructions were obtained.  Radiation dose reduction techniques included automated exposure control or exposure modulation based on body size. Radiation audit for number of CT and  nuclear cardiology exams performed in the last year: 2.       COMPARISON:   See the accompanying chest CT dictation.    FINDINGS:  Lung bases: 80 accounting chest CT dictation.    Abdomen: Lack of IV contrast media limits the study.    There is a large amount of soft tissue edema seen in the anterior, lateral, and posterior abdominal subcutaneous fat .    There is mild diffuse fatty infiltration of liver. The spleen is unremarkable. The patient is postcholecystectomy. The pancreas is largely fatty replaced. The adrenal glands are unremarkable. There is no evidence for intrarenal calculus or  hydronephrosis.    There is no evidence for bowel obstruction. Appendix is unremarkable.    There is lymphadenopathy in the retroperitoneum and inguinal regions bilaterally. There our smaller mesenteric lymph nodes and stranding in the mesentery in general. This should be further evaluated. Correlation with an abdomen pelvis CT scan with IV and  oral contrast media recommended. Please correlate for any clinical concern for lymphoma. Lymphadenopathy is nonspecific at this time. I other neoplastic, infectious, or inflammatory disease is also the differential. Tissue diagnosis may be helpful.  Largest left inguinal node is about 1.3 x 2 cm dimension. Largest right inguinal node is about 1.5 x 2 cm dimension. There is a left para-aortic node just below the left renal artery at its about 1.9 x 1.5 cm dimension. There is a right common  Iliac chain node that is about 1.3 cm in diameter. The right external iliac chain lymph node that is about 1.3 cm short axis dimension.    There is degenerative change in the spine.    Pelvis: Bladder is unremarkable. Uterus and adnexal structures are unremarkable.      Impression:         1. There is lymphadenopathy in the retroperitoneum and extending into the bilateral inguinal regions. There is also mild stranding of the mesenteric fat in general with some prominent mesenteric lymph nodes. This should be further evaluated. Please  correlate for any  clinical history of or evidence for lymphoma. Other neoplastic infectious or inflammatory diseases are all in the differential.  2. There is extensive edema in the subcutaneous fat of the abdomen most prominent anteriorly.  3. There is no ascites. There is diffuse fatty infiltration of the liver.  4. Postcholecystectomy.  5. Study is limited by lack of IV and oral contrast media. Follow-up study with both oral and IV contrast media may be helpful.        Signer Name: Veronica Cardozo MD   Signed: 7/4/2020 6:37 PM   Workstation Name: [email or web address]    [facility]    CT Chest Without Contrast [[id number]] Collected:  07/04/20 1826     Updated:  07/04/20 1828    Narrative:       INDICATION:    Chest pain and short of breath ER evaluation    TECHNIQUE:   CT of the chest without contrast. Coronal and sagittal reconstructions were obtained.  Radiation dose reduction techniques included automated exposure control or exposure modulation based on body size. Radiation audit for number of CT and nuclear  cardiology exams performed in the last year: 2.      COMPARISON:    Earlier chest x-ray and CT chest PE protocol from 8/19/2019.    FINDINGS:  Study is limited by lack of IV contrast media. There is greater than no axillary mediastinal or definite hilar adenopathy allowing for the lack of IV contrast media. There is no pleural or pericardial effusion. Heart size is within normal limits. There  is a prominent right and left anterior pericardial fat pad which results in the obscuration of the heart border on the frontal view and account for the abnormalities on the plain film. There is no evidence for acute infiltrate. There is a small amount of  dependent atelectasis.    See separate abdomen pelvis CT dictation. There is a small sclerotic lesion in the sternum which is stable and likely a bone island.      Impression:         1. This patient has prominent pericardial fat pads which account for the obscuration of  the heart border on the plain film. Cardiac silhouette size is normal and there is no acute-appearing infiltrate. No acute congestive failure appreciated. There is a  small amount of dependent atelectasis.  2. Please refer the separate abdomen pelvis CT dictation.    Signer Name: Veronica Cardozo MD   Signed: 7/4/2020 6:26 PM   Workstation Name: [facility]    [facility]    XR Chest 1 View [[id number]] Collected:  07/04/20 1555     Updated:  07/04/20 155    Narrative:       CR Chest 1 Vw    INDICATION:   Chest pain short of breath     COMPARISON:    Chest x-ray 8/19/2019.    FINDINGS:  Single portable AP view(s) of the chest.  Cardiac silhouette is mildly enlarged and increased from prior. There is subtle increase in central vascular and interstitial markings from prior with development of patchy bibasilar airspace disease. Please  correlate for clinical evidence of subtle volume overload. No obvious pleural effusion. No pneumothorax.      Impression:       Mild increase in cardiac silhouette size central vascular and interstitial markings as prior study raising concern for mild volume overload. Patchy airspace disease at the lung bases likely some patchy pulmonary edema. Follow-up recommended to ensure  resolution    Signer Name: Veronica Cardozo MD   Signed: 7/4/2020 3:55 PM   Workstation Name: [facility]    [facility]          I have discussed my impression and recommendations with the patient and family.    Thank you very much for asking us to be involved in this patient's care.  We will follow along with you.      GLO Don  07/05/20  08:42    Please note that portions of this note were completed with a voice recognition program.          Electronically signed by David He MD at 07/05/20 3317       All medication doses during the admission are shown, including meds that are no longer on order.   Scheduled Meds Sorted by Name   for Raquel Ridley  as of 07/06/20 0952     1 Day 3 Days 7 Days 10 Days < Today >    Legend:                           Inactive     Active     Other Encounter    Linked               Medications 06/26 06/27 06/28 06/29 06/30 07/01 07/02 07/03 07/04 07/05   aspirin chewable tablet 324 mg   Dose: 324 mg  Freq: Once Route: PO  Start: 07/05/20 0300 End: 07/05/20 0325    Admin Instructions:   Herbal/drug interaction: Avoid use with ginkgo biloba.  Do not exceed 4 grams of aspirin in a 24 hr period.    If given for pain, use the following pain scale:   Mild Pain = Pain Score of 1-3, CPOT 1-2  Moderate Pain = Pain Score of 4-6, CPOT 3-4  Severe Pain = Pain Score of 7-10, CPOT 5-8             0325        aspirin chewable tablet 81 mg   Dose: 81 mg  Freq: Daily Route: PO  Start: 07/05/20 0900 End: 07/05/20 1627    Admin Instructions:   Herbal/drug interaction: Avoid use with ginkgo biloba.  Do not exceed 4 grams of aspirin in a 24 hr period.    If given for pain, use the following pain scale:   Mild Pain = Pain Score of 1-3, CPOT 1-2  Moderate Pain = Pain Score of 4-6, CPOT 3-4  Severe Pain = Pain Score of 7-10, CPOT 5-8             (0901) [C]     1627-D/C'd      atorvastatin (LIPITOR) tablet 40 mg   Dose: 40 mg  Freq: Nightly Route: PO  Start: 07/05/20 0300 End: 07/05/20 1627    Admin Instructions:   Avoid grapefruit juice.             0325     1627-D/C'd      buprenorphine-naloxone (SUBOXONE) 8-2 MG per SL tablet 1 tablet   Dose: 1 tablet  Freq: Daily Route: SL  Indications of Use: Opioid Use Disorder (Continuation of Therapy)  Start: 07/05/20 0900 End: 07/05/20 1627    Admin Instructions:   If given for pain, use the following pain scale:  Mild Pain = Pain Score of 1-3, CPOT 1-2  Moderate Pain = Pain Score of 4-6, CPOT 3-4  Severe Pain = Pain Score of 7-10, CPOT 5-8             0901     1627-D/C'd      furosemide (LASIX) injection 20 mg   Dose: 20 mg  Freq: Once Route: IV  Start: 07/05/20 0900 End: 07/05/20 0901             0901         heparin (porcine) 5000 UNIT/ML injection 5,000 Units   Dose: 5,000 Units  Freq: Every 12 Hours Scheduled Route: SC  Indications Comment: Prophylaxis of Venous Thromboembolism  Start: 07/05/20 0100 End: 07/05/20 1627             0325     (0902) [C]     1627-D/C'd      ketorolac (TORADOL) injection 30 mg   Dose: 30 mg  Freq: Once Route: IV  Start: 07/04/20 1823 End: 07/04/20 1909    Admin Instructions:       If given for pain, use the following pain scale:  Mild Pain = Pain Score of 1-3, CPOT 1-2  Moderate Pain = Pain Score of 4-6, CPOT 3-4  Severe Pain = Pain Score of 7-10, CPOT 5-8            1909         LORazepam (ATIVAN) injection 1 mg   Dose: 1 mg  Freq: Once Route: IV  Start: 07/04/20 1501 End: 07/04/20 1502    Admin Instructions:    Caution: Look alike/sound alike drug alert            1502         morphine injection 4 mg   Dose: 4 mg  Freq: Once Route: IV  Start: 07/04/20 2014 End: 07/04/20 2113    Admin Instructions:   If given for pain, use the following pain scale:  Mild Pain = Pain Score of 1-3, CPOT 1-2  Moderate Pain = Pain Score of 4-6, CPOT 3-4  Severe Pain = Pain Score of 7-10, CPOT 5-8            2113         nicotine (NICODERM CQ) 14 MG/24HR patch 1 patch   Dose: 1 patch  Freq: Every 24 Hours Scheduled Route: TD  Start: 07/05/20 0900 End: 07/05/20 1627    Admin Instructions:   Apply to clean, dry, nonhairy area of skin (typically upper arm or shoulder)   Acutely Hazardous. Waste BOTH Residual Medication and/or Empty Package.             0901     1426 [C]     1627-D/C'd      nystatin (MYCOSTATIN) powder   Freq: Every 12 Hours Scheduled Route: TOP  Start: 07/05/20 0900 End: 07/05/20 1627    Admin Instructions:   Inguinal folds             0901     1627-D/C'd      pantoprazole (PROTONIX) EC tablet 40 mg   Dose: 40 mg  Freq: Every Early Morning Route: PO  Start: 07/05/20 0600 End: 07/05/20 1627    Admin Instructions:   Swallow whole; do not crush, split, or chew.             0557     1627-D/C'd       piperacillin-tazobactam (ZOSYN) 4.5 g/100 mL 0.9% NS IVPB (mbp)   Dose: 4.5 g  Freq: Once Route: IV  Indications of Use: SEPSIS  Last Dose: Stopped (07/04/20 2221)  Start: 07/04/20 2013 End: 07/04/20 2221    Admin Instructions:   Refrigerate. Break seal and mix to activate vial before use.            2113 2221         potassium chloride (K-DUR,KLOR-CON) CR tablet 40 mEq   Dose: 40 mEq  Freq: Every 4 Hours Route: PO  Start: 07/05/20 1100 End: 07/05/20 1627    Admin Instructions:   Potassium 3.1 or Less Give KCl 40 mEq q4h x3 Doses   Potassium 3.2 - 3.6 Give KCl 40 mEq q4h x2 Doses     Check Potassium 4 Hours After Last Dose Given   Check Magnesium if Potassium Level Remains Low After Replacement   DO NOT GIVE if CrCl is Less Than 30 mL/minute or Urine Output Less Than 30 mL/hr             1056     1627-D/C'd      rOPINIRole (REQUIP) tablet 2 mg   Dose: 2 mg  Freq: Once Route: PO  Start: 07/05/20 0300 End: 07/05/20 0325    Admin Instructions:   Caution: Look alike/sound alike drug alert             0325        sodium chloride 0.9 % flush 10 mL   Dose: 10 mL  Freq: Every 12 Hours Scheduled Route: IV  Start: 07/05/20 0100 End: 07/05/20 1627             0326     0902     1627-D/C'd      vancomycin 1 g/250 mL 0.9% NS (vial-mate)   Dose: 20 mg/kg  Weight Dosing Info: 52.4 kg (Ideal)  Freq: Once Route: IV  Indications of Use: SEPSIS  Last Dose: Stopped (07/04/20 2321)  Start: 07/04/20 2100 End: 07/04/20 2321 2221 2321         Medications 06/26 06/27 06/28 06/29 06/30 07/01 07/02 07/03 07/04 07/05       Continuous Meds Sorted by Name   for Raquel Ridley as of 07/06/20 0952    Legend:                           Inactive     Active     Other Encounter    Linked               Medications 06/26 06/27 06/28 06/29 06/30 07/01 07/02 07/03 07/04 07/05       PRN Meds Sorted by Name   for Raquel Ridley as of 07/06/20 0952    Legend:                           Inactive     Active     Other Encounter    Linked                Medications 06/26 06/27 06/28 06/29 06/30 07/01 07/02 07/03 07/04 07/05   acetaminophen (TYLENOL) tablet 650 mg   Dose: 650 mg  Freq: Every 6 Hours PRN Route: PO  PRN Reason: Mild Pain   Start: 07/05/20 0201 End: 07/05/20 1627    Admin Instructions:   Do not exceed 4 grams of acetaminophen in a 24 hr period.    If given for pain, use the following pain scale:   Mild Pain = Pain Score of 1-3, CPOT 1-2  Moderate Pain = Pain Score of 4-6, CPOT 3-4  Severe Pain = Pain Score of 7-10, CPOT 5-8             1627-D/C'd      ipratropium-albuterol (DUO-NEB) nebulizer solution 3 mL   Dose: 3 mL  Freq: Every 4 Hours PRN Route: NEBULIZATION  PRN Reasons: Wheezing,Shortness of Air  Start: 07/05/20 0209 End: 07/05/20 1627             0249) [C]     1627-D/C'd      morphine injection 2 mg   Dose: 2 mg  Freq: Every 4 Hours PRN Route: IV  PRN Reason: Severe Pain   Start: 07/05/20 0202 End: 07/05/20 0623    Admin Instructions:   Hold for oversedation, respiratory depression, and/or SBP < 110  If given for pain, use the following pain scale:  Mild Pain = Pain Score of 1-3, CPOT 1-2  Moderate Pain = Pain Score of 4-6, CPOT 3-4  Severe Pain = Pain Score of 7-10, CPOT 5-8             0623-D/C'd      nitroglycerin (NITROSTAT) SL tablet 0.4 mg   Dose: 0.4 mg  Freq: Every 5 Minutes PRN Route: SL  PRN Reason: Chest Pain  PRN Comment: Only if SBP Greater Than 100  Start: 07/05/20 0006 End: 07/05/20 1627    Admin Instructions:   If Pain Unrelieved After 3 Doses Notify MD             1627-D/C'd      ondansetron (ZOFRAN) injection 4 mg   Dose: 4 mg  Freq: Every 6 Hours PRN Route: IV  PRN Reasons: Nausea,Vomiting  Start: 07/05/20 0202 End: 07/05/20 1627             1627-D/C'd      potassium chloride (K-DUR,KLOR-CON) CR tablet 40 mEq   Dose: 40 mEq  Freq: As Needed Route: PO  PRN Comment: Potassium Replacement.  See Admin Instructions  Start: 07/05/20 0006 End: 07/05/20 1627    Admin Instructions:   Potassium 3.1 or Less Give KCl 40 mEq q4h x3  Doses   Potassium 3.2 - 3.6 Give KCl 40 mEq q4h x2 Doses     Check Potassium 4 Hours After Last Dose Given   Check Magnesium if Potassium Level Remains Low After Replacement   DO NOT GIVE if CrCl is Less Than 30 mL/minute or Urine Output Less Than 30 mL/hr             1627-D/C'd      Or  potassium chloride (KLOR-CON) packet 40 mEq   Dose: 40 mEq  Freq: As Needed Route: PO  PRN Comment: potassium replacement, see admin instructions  Start: 07/05/20 0006 End: 07/05/20 1627    Admin Instructions:   Potassium 3.1 or Less Give KCl 40 mEq q4h x3 Doses   Potassium 3.2 - 3.6 Give KCl 40 mEq q4h x2 Doses     Check Potassium 4 Hours After Last Dose Given   Check Magnesium if Potassium Level Remains Low After Replacement   DO NOT GIVE if CrCl is Less Than 30 mL/minute or Urine Output Less Than 30 mL/hr             1627-D/C'd      Or  potassium chloride 10 mEq in 100 mL IVPB   Dose: 10 mEq  Freq: Every 1 Hour PRN Route: IV  PRN Comment: Potassium Replacement - See Admin Instructions  Start: 07/05/20 0006 End: 07/05/20 1627    Admin Instructions:   Peripheral or Central IV  Potassium 3.1 or Less Give KCl 10 mEq/100 mL NS IV q1h x6 Doses  Potassium 3.2 - 3.6 Give KCl 10 mEq/100 mL NS q1h x4 Doses    Check Potassium 4 Hours After Last Dose Given  Check Magnesium if Potassium Remains Low After Replacement  DO NOT GIVE if CrCl is Less Than 30 mL/minute or Urine Output Less Than 30 mL/hr.     Rates Greater Than 10 mEq/hr Require ECG Monitoring.  OUTPATIENT/NON-MONITORED UNITS: Potassium Chloride standard bolus infusion rate is a maximum of 10 mEq/hr on unmonitored patients    MONITORED UNITS: Potassium Chloride standard bolus infusion rate is a maximum of 20 mEq/hr on ECG monitored patients ONLY             1627-D/C'd      prochlorperazine (COMPAZINE) injection 10 mg   Dose: 10 mg  Freq: Every 6 Hours PRN Route: IV  PRN Reasons: Nausea,Vomiting  Start: 07/04/20 2012 End: 07/05/20 0006 2113 0006-D/C'd      sodium  chloride 0.9 % flush 10 mL   Dose: 10 mL  Freq: As Needed Route: IV  PRN Reason: Line Care  Start: 07/05/20 0006 End: 07/05/20 1627             1627-D/C'd      sodium chloride 0.9 % flush 10 mL   Dose: 10 mL  Freq: As Needed Route: IV  PRN Reason: Line Care  Start: 07/04/20 1503 End: 07/05/20 1627             1627-D/C'd      Medications 06/26 06/27 06/28 06/29 06/30 07/01 07/02 07/03 07/04 07/05

## 2020-07-06 NOTE — PROGRESS NOTES
Case Management Discharge Note      Final Note: Patient discharged home on 7/5/20.  No needs identified.           Destination      No service has been selected for the patient.      Durable Medical Equipment      No service has been selected for the patient.      Dialysis/Infusion      No service has been selected for the patient.      Home Medical Care      No service has been selected for the patient.      Therapy      No service has been selected for the patient.      Community Resources      No service has been selected for the patient.             Final Discharge Disposition Code: 01 - home or self-care

## 2020-07-06 NOTE — OUTREACH NOTE
Prep Survey      Responses   [facility] patient discharged from?  [facility]   Is LACE score < 7 ?  Yes   Eligibility  Readm Mgmt   Discharge diagnosis   Transient leukocytosis   COVID-19 Test Status  Not tested   Does the patient have one of the following disease processes/diagnoses(primary or secondary)?  Other   Does the patient have Home health ordered?  No   Is there a DME ordered?  No   Prep survey completed?  Yes          Cathryn Watkins RN

## 2020-07-09 LAB
BACTERIA SPEC AEROBE CULT: NORMAL
BACTERIA SPEC AEROBE CULT: NORMAL

## 2020-07-22 ENCOUNTER — HOSPITAL ENCOUNTER (EMERGENCY)
Facility: HOSPITAL | Age: 45
Discharge: HOME | End: 2020-07-22
Attending: EMERGENCY MEDICINE | Admitting: EMERGENCY MEDICINE

## 2020-07-22 ENCOUNTER — APPOINTMENT (OUTPATIENT)
Dept: CT IMAGING | Facility: HOSPITAL | Age: 45
End: 2020-07-22

## 2020-07-22 VITALS
HEIGHT: 63 IN | HEART RATE: 78 BPM | TEMPERATURE: 98 F | BODY MASS INDEX: 51.91 KG/M2 | DIASTOLIC BLOOD PRESSURE: 85 MMHG | OXYGEN SATURATION: 95 % | WEIGHT: 293 LBS | SYSTOLIC BLOOD PRESSURE: 132 MMHG | RESPIRATION RATE: 20 BRPM

## 2020-07-22 DIAGNOSIS — M79.3 PANNICULITIS: Primary | ICD-10-CM

## 2020-07-22 DIAGNOSIS — L03.311 CELLULITIS OF ABDOMINAL WALL: ICD-10-CM

## 2020-07-22 LAB
ALBUMIN SERPL-MCNC: 3.62 G/DL (ref 3.5–5.2)
ALBUMIN/GLOB SERPL: 1.2 G/DL
ALP SERPL-CCNC: 137 U/L (ref 39–117)
ALT SERPL W P-5'-P-CCNC: 13 U/L (ref 1–33)
ANION GAP SERPL CALCULATED.3IONS-SCNC: 13.6 MMOL/L (ref 5–15)
AST SERPL-CCNC: 11 U/L (ref 1–32)
BASOPHILS # BLD AUTO: 0.03 10*3/MM3 (ref 0–0.2)
BASOPHILS NFR BLD AUTO: 0.3 % (ref 0–1.5)
BILIRUB SERPL-MCNC: <0.2 MG/DL (ref 0–1.2)
BUN SERPL-MCNC: 10 MG/DL (ref 6–20)
BUN/CREAT SERPL: 12.7 (ref 7–25)
CALCIUM SPEC-SCNC: 8.4 MG/DL (ref 8.6–10.5)
CHLORIDE SERPL-SCNC: 99 MMOL/L (ref 98–107)
CO2 SERPL-SCNC: 27.4 MMOL/L (ref 22–29)
CREAT SERPL-MCNC: 0.79 MG/DL (ref 0.57–1)
CRP SERPL-MCNC: 1.79 MG/DL (ref 0–0.5)
D-LACTATE SERPL-SCNC: 3.7 MMOL/L (ref 0.5–2)
DEPRECATED RDW RBC AUTO: 48.2 FL (ref 37–54)
EOSINOPHIL # BLD AUTO: 0.22 10*3/MM3 (ref 0–0.4)
EOSINOPHIL NFR BLD AUTO: 2.4 % (ref 0.3–6.2)
ERYTHROCYTE [DISTWIDTH] IN BLOOD BY AUTOMATED COUNT: 15.6 % (ref 12.3–15.4)
GFR SERPL CREATININE-BSD FRML MDRD: 79 ML/MIN/1.73
GLOBULIN UR ELPH-MCNC: 3.1 GM/DL
GLUCOSE SERPL-MCNC: 324 MG/DL (ref 65–99)
HCT VFR BLD AUTO: 32.3 % (ref 34–46.6)
HGB BLD-MCNC: 9.7 G/DL (ref 12–15.9)
IMM GRANULOCYTES # BLD AUTO: 0.15 10*3/MM3 (ref 0–0.05)
IMM GRANULOCYTES NFR BLD AUTO: 1.6 % (ref 0–0.5)
LACTATE HOLD SPECIMEN: NORMAL
LIPASE SERPL-CCNC: 9 U/L (ref 13–60)
LYMPHOCYTES # BLD AUTO: 2.58 10*3/MM3 (ref 0.7–3.1)
LYMPHOCYTES NFR BLD AUTO: 27.7 % (ref 19.6–45.3)
MCH RBC QN AUTO: 25.7 PG (ref 26.6–33)
MCHC RBC AUTO-ENTMCNC: 30 G/DL (ref 31.5–35.7)
MCV RBC AUTO: 85.4 FL (ref 79–97)
MONOCYTES # BLD AUTO: 0.43 10*3/MM3 (ref 0.1–0.9)
MONOCYTES NFR BLD AUTO: 4.6 % (ref 5–12)
NEUTROPHILS NFR BLD AUTO: 5.89 10*3/MM3 (ref 1.7–7)
NEUTROPHILS NFR BLD AUTO: 63.4 % (ref 42.7–76)
NRBC BLD AUTO-RTO: 0 /100 WBC (ref 0–0.2)
NT-PROBNP SERPL-MCNC: 55.8 PG/ML (ref 0–450)
PLATELET # BLD AUTO: 366 10*3/MM3 (ref 140–450)
PMV BLD AUTO: 9.7 FL (ref 6–12)
POTASSIUM SERPL-SCNC: 3.4 MMOL/L (ref 3.5–5.2)
PROT SERPL-MCNC: 6.7 G/DL (ref 6–8.5)
RBC # BLD AUTO: 3.78 10*6/MM3 (ref 3.77–5.28)
SODIUM SERPL-SCNC: 140 MMOL/L (ref 136–145)
WBC # BLD AUTO: 9.3 10*3/MM3 (ref 3.4–10.8)

## 2020-07-22 PROCEDURE — 83880 ASSAY OF NATRIURETIC PEPTIDE: CPT | Performed by: NURSE PRACTITIONER

## 2020-07-22 PROCEDURE — 0 IOVERSOL 68 % SOLUTION: Performed by: EMERGENCY MEDICINE

## 2020-07-22 PROCEDURE — 86140 C-REACTIVE PROTEIN: CPT | Performed by: NURSE PRACTITIONER

## 2020-07-22 PROCEDURE — 99284 EMERGENCY DEPT VISIT MOD MDM: CPT

## 2020-07-22 PROCEDURE — 87040 BLOOD CULTURE FOR BACTERIA: CPT | Performed by: NURSE PRACTITIONER

## 2020-07-22 PROCEDURE — 74177 CT ABD & PELVIS W/CONTRAST: CPT | Performed by: RADIOLOGY

## 2020-07-22 PROCEDURE — 25010000002 CEFTRIAXONE: Performed by: NURSE PRACTITIONER

## 2020-07-22 PROCEDURE — 96365 THER/PROPH/DIAG IV INF INIT: CPT

## 2020-07-22 PROCEDURE — 83605 ASSAY OF LACTIC ACID: CPT | Performed by: NURSE PRACTITIONER

## 2020-07-22 PROCEDURE — 80053 COMPREHEN METABOLIC PANEL: CPT | Performed by: NURSE PRACTITIONER

## 2020-07-22 PROCEDURE — 83690 ASSAY OF LIPASE: CPT | Performed by: NURSE PRACTITIONER

## 2020-07-22 PROCEDURE — 74177 CT ABD & PELVIS W/CONTRAST: CPT

## 2020-07-22 PROCEDURE — 96367 TX/PROPH/DG ADDL SEQ IV INF: CPT

## 2020-07-22 PROCEDURE — 85025 COMPLETE CBC W/AUTO DIFF WBC: CPT | Performed by: NURSE PRACTITIONER

## 2020-07-22 RX ORDER — SODIUM CHLORIDE 0.9 % (FLUSH) 0.9 %
10 SYRINGE (ML) INJECTION AS NEEDED
Status: DISCONTINUED | OUTPATIENT
Start: 2020-07-22 | End: 2020-07-22 | Stop reason: HOSPADM

## 2020-07-22 RX ORDER — CEPHALEXIN 500 MG/1
500 CAPSULE ORAL 3 TIMES DAILY
Qty: 30 CAPSULE | Refills: 0 | Status: SHIPPED | OUTPATIENT
Start: 2020-07-22 | End: 2020-08-01

## 2020-07-22 RX ORDER — DOXYCYCLINE 100 MG/1
100 CAPSULE ORAL 2 TIMES DAILY
Qty: 20 CAPSULE | Refills: 0 | OUTPATIENT
Start: 2020-07-22 | End: 2022-02-19

## 2020-07-22 RX ADMIN — IOVERSOL 100 ML: 678 INJECTION INTRA-ARTERIAL; INTRAVENOUS at 09:15

## 2020-07-22 RX ADMIN — CEFTRIAXONE 1 G: 1 INJECTION, POWDER, FOR SOLUTION INTRAMUSCULAR; INTRAVENOUS at 08:03

## 2020-07-22 RX ADMIN — DOXYCYCLINE 100 MG: 100 INJECTION, POWDER, LYOPHILIZED, FOR SOLUTION INTRAVENOUS at 10:51

## 2020-07-22 NOTE — DISCHARGE INSTRUCTIONS
Follow up with your pirmary care provider in 2-3 days.    Return to the emergency room for worsening symptoms.

## 2020-07-22 NOTE — ED PROVIDER NOTES
Subjective   The patient is a 44 year old female with history significant for DM, CHF, COPD, Morbid obesity that presents to ED for abdominal distention and swelling and lymphadenopathy to bilateral groin areas. She says this is ongoing x 15 years but worsening recently. She says she was seen here 2 weeks ago and was advised to follow up with general surgery, she says she has yet to make an appt. She also reports mild SOA but says due to COPD and CHF she always feels this way. She says her pain in her right groin is unbearable due to the lymphadenopathy. She denies recent fever, however is a poor historian.      History provided by:  Patient   used: No    Abdominal Pain   Pain location:  Generalized  Pain quality: sharp    Pain radiates to:  Groin  Pain severity:  Moderate  Onset quality:  Gradual  Duration:  2 weeks  Timing:  Intermittent  Progression:  Waxing and waning  Chronicity:  Recurrent  Relieved by:  Nothing  Worsened by:  Nothing  Ineffective treatments:  None tried  Risk factors: obesity        Review of Systems   Constitutional: Negative.    HENT: Negative.    Eyes: Negative.    Respiratory: Negative.    Gastrointestinal: Positive for abdominal distention and abdominal pain.   Endocrine: Negative.    Genitourinary: Positive for pelvic pain.   Allergic/Immunologic: Negative.    Neurological: Negative.    Psychiatric/Behavioral: Negative.    All other systems reviewed and are negative.      Past Medical History:   Diagnosis Date   • Anxiety disorder 7/4/2020   • Arthritis    • CHF (congestive heart failure) (CMS/[facility])    • COPD (chronic obstructive pulmonary disease) (CMS/[facility])    • Diabetes mellitus (CMS/[facility])    • GERD (gastroesophageal reflux disease)    • Hyperlipidemia    • Hypertension    • Morbid obesity (CMS/[facility]) 7/4/2020   • Pseudotumor cerebri    • Restless leg    • Sleep apnea        Allergies   Allergen Reactions   • Erythromycin Rash       Past Surgical History:   Procedure  Laterality Date   •  SECTION     • CHOLECYSTECTOMY     • TONSILLECTOMY     • TUBAL ABDOMINAL LIGATION         Family History   Problem Relation Age of Onset   • Diabetes Mother    • Hypertension Mother    • Stroke Father    • Hypertension Father    • Diabetes Father    • Cancer Paternal Grandmother         breat and liver        Social History     Socioeconomic History   • Marital status: Legally      Spouse name: Not on file   • Number of children: Not on file   • Years of education: Not on file   • Highest education level: Not on file   Tobacco Use   • Smoking status: Current Every Day Smoker     Packs/day: 1.00     Types: Cigarettes, Electronic Cigarette   • Smokeless tobacco: Never Used   Substance and Sexual Activity   • Alcohol use: No     Frequency: Never   • Drug use: No   • Sexual activity: Defer           Objective   Physical Exam   Constitutional: She is oriented to person, place, and time. She appears well-developed.   HENT:   Head: Normocephalic.   Eyes: Pupils are equal, round, and reactive to light.   Cardiovascular: Normal rate, regular rhythm and normal heart sounds.   Pulmonary/Chest: She has wheezes.   Inspiratory wheezing   Abdominal: Bowel sounds are decreased. There is tenderness.   Abdominal distention, left lower abdomen tenderness and mild erythema. Morbidly obese.  Bilateral inguinal lymphadenopathy   Neurological: She is alert and oriented to person, place, and time.   Skin: Skin is warm. Capillary refill takes less than 2 seconds.   Psychiatric: She has a normal mood and affect. Her behavior is normal.   Nursing note and vitals reviewed.      Procedures           ED Course                                           MDM    Final diagnoses:   Panniculitis   Cellulitis of abdominal wall            [place]i bowen Мария, APRN  [phone]

## 2020-07-27 LAB
BACTERIA SPEC AEROBE CULT: NORMAL
BACTERIA SPEC AEROBE CULT: NORMAL

## 2020-08-05 ENCOUNTER — APPOINTMENT (OUTPATIENT)
Dept: CT IMAGING | Facility: HOSPITAL | Age: 45
End: 2020-08-05

## 2020-08-05 ENCOUNTER — HOSPITAL ENCOUNTER (EMERGENCY)
Facility: HOSPITAL | Age: 45
Discharge: HOME | End: 2020-08-05
Attending: EMERGENCY MEDICINE | Admitting: EMERGENCY MEDICINE

## 2020-08-05 VITALS
TEMPERATURE: 98 F | RESPIRATION RATE: 18 BRPM | OXYGEN SATURATION: 99 % | BODY MASS INDEX: 51.91 KG/M2 | WEIGHT: 293 LBS | SYSTOLIC BLOOD PRESSURE: 121 MMHG | DIASTOLIC BLOOD PRESSURE: 79 MMHG | HEART RATE: 83 BPM | HEIGHT: 63 IN

## 2020-08-05 DIAGNOSIS — R51.9 ACUTE NONINTRACTABLE HEADACHE, UNSPECIFIED HEADACHE TYPE: ICD-10-CM

## 2020-08-05 DIAGNOSIS — R59.1 LYMPHADENOPATHY: Primary | ICD-10-CM

## 2020-08-05 DIAGNOSIS — R06.02 SHORTNESS OF BREATH: ICD-10-CM

## 2020-08-05 LAB
6-ACETYL MORPHINE: NEGATIVE
A-A DO2: 22.7 MMHG (ref 0–300)
ALBUMIN SERPL-MCNC: 3.54 G/DL (ref 3.5–5.2)
ALBUMIN/GLOB SERPL: 1 G/DL
ALP SERPL-CCNC: 141 U/L (ref 39–117)
ALT SERPL W P-5'-P-CCNC: 15 U/L (ref 1–33)
AMPHET+METHAMPHET UR QL: NEGATIVE
ANION GAP SERPL CALCULATED.3IONS-SCNC: 12 MMOL/L (ref 5–15)
ARTERIAL PATENCY WRIST A: POSITIVE
AST SERPL-CCNC: 18 U/L (ref 1–32)
ATMOSPHERIC PRESS: 728 MMHG
BARBITURATES UR QL SCN: NEGATIVE
BASE EXCESS BLDA CALC-SCNC: 2.4 MMOL/L (ref 0–2)
BASOPHILS # BLD AUTO: 0.04 10*3/MM3 (ref 0–0.2)
BASOPHILS NFR BLD AUTO: 0.3 % (ref 0–1.5)
BDY SITE: ABNORMAL
BENZODIAZ UR QL SCN: NEGATIVE
BILIRUB SERPL-MCNC: <0.2 MG/DL (ref 0–1.2)
BILIRUB UR QL STRIP: NEGATIVE
BODY TEMPERATURE: 0 C
BUN SERPL-MCNC: 11 MG/DL (ref 6–20)
BUN/CREAT SERPL: 13.1 (ref 7–25)
BUPRENORPHINE SERPL-MCNC: POSITIVE NG/ML
CALCIUM SPEC-SCNC: 8.7 MG/DL (ref 8.6–10.5)
CANNABINOIDS SERPL QL: NEGATIVE
CHLORIDE SERPL-SCNC: 99 MMOL/L (ref 98–107)
CK SERPL-CCNC: 165 U/L (ref 20–180)
CLARITY UR: CLEAR
CO2 BLDA-SCNC: 29.8 MMOL/L (ref 22–33)
CO2 SERPL-SCNC: 25 MMOL/L (ref 22–29)
COCAINE UR QL: NEGATIVE
COHGB MFR BLD: 3.3 % (ref 0–5)
COLOR UR: YELLOW
CREAT SERPL-MCNC: 0.84 MG/DL (ref 0.57–1)
CRP SERPL-MCNC: 2.63 MG/DL (ref 0–0.5)
D DIMER PPP FEU-MCNC: 0.53 MCGFEU/ML (ref 0–0.5)
DEPRECATED RDW RBC AUTO: 50.4 FL (ref 37–54)
EOSINOPHIL # BLD AUTO: 0.23 10*3/MM3 (ref 0–0.4)
EOSINOPHIL NFR BLD AUTO: 2 % (ref 0.3–6.2)
ERYTHROCYTE [DISTWIDTH] IN BLOOD BY AUTOMATED COUNT: 16.1 % (ref 12.3–15.4)
ERYTHROCYTE [SEDIMENTATION RATE] IN BLOOD: 44 MM/HR (ref 0–20)
GFR SERPL CREATININE-BSD FRML MDRD: 74 ML/MIN/1.73
GLOBULIN UR ELPH-MCNC: 3.5 GM/DL
GLUCOSE SERPL-MCNC: 170 MG/DL (ref 65–99)
GLUCOSE UR STRIP-MCNC: NEGATIVE MG/DL
HCO3 BLDA-SCNC: 28.3 MMOL/L (ref 20–26)
HCT VFR BLD AUTO: 33.5 % (ref 34–46.6)
HCT VFR BLD CALC: 31.3 % (ref 38–51)
HGB BLD-MCNC: 10 G/DL (ref 12–15.9)
HGB BLDA-MCNC: 10.2 G/DL (ref 13.5–17.5)
HGB UR QL STRIP.AUTO: NEGATIVE
IMM GRANULOCYTES # BLD AUTO: 0.19 10*3/MM3 (ref 0–0.05)
IMM GRANULOCYTES NFR BLD AUTO: 1.6 % (ref 0–0.5)
INHALED O2 CONCENTRATION: 21 %
INR PPP: 0.96 (ref 0.9–1.1)
KETONES UR QL STRIP: NEGATIVE
LEUKOCYTE ESTERASE UR QL STRIP.AUTO: NEGATIVE
LYMPHOCYTES # BLD AUTO: 2.65 10*3/MM3 (ref 0.7–3.1)
LYMPHOCYTES NFR BLD AUTO: 22.8 % (ref 19.6–45.3)
Lab: ABNORMAL
MAGNESIUM SERPL-MCNC: 1.5 MG/DL (ref 1.6–2.6)
MCH RBC QN AUTO: 25.4 PG (ref 26.6–33)
MCHC RBC AUTO-ENTMCNC: 29.9 G/DL (ref 31.5–35.7)
MCV RBC AUTO: 85 FL (ref 79–97)
METHADONE UR QL SCN: NEGATIVE
METHGB BLD QL: 0 % (ref 0–3)
MODALITY: ABNORMAL
MONOCYTES # BLD AUTO: 0.51 10*3/MM3 (ref 0.1–0.9)
MONOCYTES NFR BLD AUTO: 4.4 % (ref 5–12)
NEUTROPHILS NFR BLD AUTO: 68.9 % (ref 42.7–76)
NEUTROPHILS NFR BLD AUTO: 8 10*3/MM3 (ref 1.7–7)
NITRITE UR QL STRIP: NEGATIVE
NOTE: ABNORMAL
NRBC BLD AUTO-RTO: 0 /100 WBC (ref 0–0.2)
NT-PROBNP SERPL-MCNC: 41.7 PG/ML (ref 0–450)
OPIATES UR QL: NEGATIVE
OXYCODONE UR QL SCN: NEGATIVE
OXYHGB MFR BLDV: 89.7 % (ref 94–99)
PCO2 BLDA: 49.1 MM HG (ref 35–45)
PCO2 TEMP ADJ BLD: ABNORMAL MM[HG]
PCP UR QL SCN: NEGATIVE
PH BLDA: 7.37 PH UNITS (ref 7.35–7.45)
PH UR STRIP.AUTO: <=5 [PH] (ref 5–8)
PH, TEMP CORRECTED: ABNORMAL
PHOSPHATE SERPL-MCNC: 2.7 MG/DL (ref 2.5–4.5)
PLATELET # BLD AUTO: 351 10*3/MM3 (ref 140–450)
PMV BLD AUTO: 9.7 FL (ref 6–12)
PO2 BLDA: 65 MM HG (ref 83–108)
PO2 TEMP ADJ BLD: ABNORMAL MM[HG]
POTASSIUM SERPL-SCNC: 4.1 MMOL/L (ref 3.5–5.2)
PROT SERPL-MCNC: 7 G/DL (ref 6–8.5)
PROT UR QL STRIP: NEGATIVE
PROTHROMBIN TIME: 12.5 SECONDS (ref 11.9–14.1)
RBC # BLD AUTO: 3.94 10*6/MM3 (ref 3.77–5.28)
SAO2 % BLDCOA: 92.7 % (ref 94–99)
SODIUM SERPL-SCNC: 136 MMOL/L (ref 136–145)
SP GR UR STRIP: 1.01 (ref 1–1.03)
T4 FREE SERPL-MCNC: 0.91 NG/DL (ref 0.93–1.7)
TROPONIN T SERPL-MCNC: <0.01 NG/ML (ref 0–0.03)
TROPONIN T SERPL-MCNC: <0.01 NG/ML (ref 0–0.03)
TSH SERPL DL<=0.05 MIU/L-ACNC: 2.5 UIU/ML (ref 0.27–4.2)
UROBILINOGEN UR QL STRIP: NORMAL
VENTILATOR MODE: ABNORMAL
WBC # BLD AUTO: 11.62 10*3/MM3 (ref 3.4–10.8)

## 2020-08-05 PROCEDURE — 71275 CT ANGIOGRAPHY CHEST: CPT | Performed by: RADIOLOGY

## 2020-08-05 PROCEDURE — 83880 ASSAY OF NATRIURETIC PEPTIDE: CPT | Performed by: EMERGENCY MEDICINE

## 2020-08-05 PROCEDURE — 25010000002 PROMETHAZINE PER 50 MG: Performed by: EMERGENCY MEDICINE

## 2020-08-05 PROCEDURE — 84484 ASSAY OF TROPONIN QUANT: CPT | Performed by: EMERGENCY MEDICINE

## 2020-08-05 PROCEDURE — 85652 RBC SED RATE AUTOMATED: CPT | Performed by: EMERGENCY MEDICINE

## 2020-08-05 PROCEDURE — 82550 ASSAY OF CK (CPK): CPT | Performed by: EMERGENCY MEDICINE

## 2020-08-05 PROCEDURE — 85610 PROTHROMBIN TIME: CPT | Performed by: EMERGENCY MEDICINE

## 2020-08-05 PROCEDURE — 80307 DRUG TEST PRSMV CHEM ANLYZR: CPT | Performed by: EMERGENCY MEDICINE

## 2020-08-05 PROCEDURE — 82805 BLOOD GASES W/O2 SATURATION: CPT

## 2020-08-05 PROCEDURE — 81003 URINALYSIS AUTO W/O SCOPE: CPT | Performed by: EMERGENCY MEDICINE

## 2020-08-05 PROCEDURE — 99284 EMERGENCY DEPT VISIT MOD MDM: CPT

## 2020-08-05 PROCEDURE — 80053 COMPREHEN METABOLIC PANEL: CPT | Performed by: EMERGENCY MEDICINE

## 2020-08-05 PROCEDURE — 71250 CT THORAX DX C-: CPT

## 2020-08-05 PROCEDURE — 85025 COMPLETE CBC W/AUTO DIFF WBC: CPT | Performed by: EMERGENCY MEDICINE

## 2020-08-05 PROCEDURE — 84443 ASSAY THYROID STIM HORMONE: CPT | Performed by: EMERGENCY MEDICINE

## 2020-08-05 PROCEDURE — 96375 TX/PRO/DX INJ NEW DRUG ADDON: CPT

## 2020-08-05 PROCEDURE — 82375 ASSAY CARBOXYHB QUANT: CPT

## 2020-08-05 PROCEDURE — 93010 ELECTROCARDIOGRAM REPORT: CPT | Performed by: INTERNAL MEDICINE

## 2020-08-05 PROCEDURE — 93005 ELECTROCARDIOGRAM TRACING: CPT | Performed by: EMERGENCY MEDICINE

## 2020-08-05 PROCEDURE — 25010000002 METHYLPREDNISOLONE PER 125 MG: Performed by: EMERGENCY MEDICINE

## 2020-08-05 PROCEDURE — 86140 C-REACTIVE PROTEIN: CPT | Performed by: EMERGENCY MEDICINE

## 2020-08-05 PROCEDURE — 83735 ASSAY OF MAGNESIUM: CPT | Performed by: EMERGENCY MEDICINE

## 2020-08-05 PROCEDURE — 84439 ASSAY OF FREE THYROXINE: CPT | Performed by: EMERGENCY MEDICINE

## 2020-08-05 PROCEDURE — 25010000002 FUROSEMIDE PER 20 MG: Performed by: EMERGENCY MEDICINE

## 2020-08-05 PROCEDURE — 84100 ASSAY OF PHOSPHORUS: CPT | Performed by: EMERGENCY MEDICINE

## 2020-08-05 PROCEDURE — 0 IOVERSOL 74 % SOLUTION: Performed by: EMERGENCY MEDICINE

## 2020-08-05 PROCEDURE — 71275 CT ANGIOGRAPHY CHEST: CPT

## 2020-08-05 PROCEDURE — 36600 WITHDRAWAL OF ARTERIAL BLOOD: CPT

## 2020-08-05 PROCEDURE — 85379 FIBRIN DEGRADATION QUANT: CPT | Performed by: EMERGENCY MEDICINE

## 2020-08-05 PROCEDURE — 96374 THER/PROPH/DIAG INJ IV PUSH: CPT

## 2020-08-05 PROCEDURE — 83050 HGB METHEMOGLOBIN QUAN: CPT

## 2020-08-05 PROCEDURE — 74176 CT ABD & PELVIS W/O CONTRAST: CPT

## 2020-08-05 RX ORDER — PROMETHAZINE HYDROCHLORIDE 25 MG/ML
12.5 INJECTION, SOLUTION INTRAMUSCULAR; INTRAVENOUS ONCE
Status: COMPLETED | OUTPATIENT
Start: 2020-08-05 | End: 2020-08-05

## 2020-08-05 RX ORDER — SODIUM CHLORIDE 0.9 % (FLUSH) 0.9 %
10 SYRINGE (ML) INJECTION AS NEEDED
Status: DISCONTINUED | OUTPATIENT
Start: 2020-08-05 | End: 2020-08-05 | Stop reason: HOSPADM

## 2020-08-05 RX ORDER — BUTALBITAL, ACETAMINOPHEN AND CAFFEINE 50; 325; 40 MG/1; MG/1; MG/1
1 TABLET ORAL ONCE
Status: COMPLETED | OUTPATIENT
Start: 2020-08-05 | End: 2020-08-05

## 2020-08-05 RX ORDER — FUROSEMIDE 10 MG/ML
40 INJECTION INTRAMUSCULAR; INTRAVENOUS ONCE
Status: COMPLETED | OUTPATIENT
Start: 2020-08-05 | End: 2020-08-05

## 2020-08-05 RX ORDER — METHYLPREDNISOLONE SODIUM SUCCINATE 125 MG/2ML
125 INJECTION, POWDER, LYOPHILIZED, FOR SOLUTION INTRAMUSCULAR; INTRAVENOUS ONCE
Status: COMPLETED | OUTPATIENT
Start: 2020-08-05 | End: 2020-08-05

## 2020-08-05 RX ADMIN — METHYLPREDNISOLONE SODIUM SUCCINATE 125 MG: 125 INJECTION, POWDER, FOR SOLUTION INTRAMUSCULAR; INTRAVENOUS at 08:19

## 2020-08-05 RX ADMIN — PROMETHAZINE HYDROCHLORIDE 12.5 MG: 25 INJECTION, SOLUTION INTRAMUSCULAR; INTRAVENOUS at 04:31

## 2020-08-05 RX ADMIN — FUROSEMIDE 40 MG: 10 INJECTION, SOLUTION INTRAMUSCULAR; INTRAVENOUS at 08:18

## 2020-08-05 RX ADMIN — IOVERSOL 100 ML: 741 INJECTION INTRA-ARTERIAL; INTRAVENOUS at 08:03

## 2020-08-05 RX ADMIN — BUTALBITAL, ACETAMINOPHEN, AND CAFFEINE 1 TABLET: 50; 325; 40 TABLET ORAL at 09:10

## 2020-08-05 NOTE — ED PROVIDER NOTES
Subjective   44-year-old female in the emergency department complaining of increased SOB over the last several weeks.  Recently discharged from Hospital on 2020 with similar complaint.  Patient reports no improvement so returned to the ED for reevaluation.  Has extensive PMH, so please refer to the chart.      History provided by:  Patient   used: No        Review of Systems   Constitutional: Negative for activity change, appetite change, chills, diaphoresis, fatigue and fever.   HENT: Negative for congestion, ear pain and sore throat.    Eyes: Negative for redness.   Respiratory: Positive for shortness of breath. Negative for cough, chest tightness and wheezing.    Cardiovascular: Positive for leg swelling. Negative for chest pain and palpitations.   Gastrointestinal: Negative for abdominal pain, diarrhea, nausea and vomiting.   Genitourinary: Negative for dysuria and urgency.   Musculoskeletal: Negative for arthralgias, back pain, myalgias and neck pain.   Skin: Negative for pallor, rash and wound.   Neurological: Negative for dizziness, speech difficulty, weakness and headaches.   Psychiatric/Behavioral: Negative for agitation, behavioral problems, confusion and decreased concentration.   All other systems reviewed and are negative.      Past Medical History:   Diagnosis Date   • Anxiety disorder 2020   • Arthritis    • CHF (congestive heart failure) (CMS/[facility])    • COPD (chronic obstructive pulmonary disease) (CMS/[facility])    • Diabetes mellitus (CMS/[facility])    • GERD (gastroesophageal reflux disease)    • Hyperlipidemia    • Hypertension    • Morbid obesity (CMS/[facility]) 2020   • Pseudotumor cerebri    • Restless leg    • Sleep apnea        Allergies   Allergen Reactions   • Erythromycin Rash       Past Surgical History:   Procedure Laterality Date   •  SECTION     • CHOLECYSTECTOMY     • TONSILLECTOMY     • TUBAL ABDOMINAL LIGATION         Family History   Problem Relation Age of  Onset   • Diabetes Mother    • Hypertension Mother    • Stroke Father    • Hypertension Father    • Diabetes Father    • Cancer Paternal Grandmother         breat and liver        Social History     Socioeconomic History   • Marital status: Legally      Spouse name: Not on file   • Number of children: Not on file   • Years of education: Not on file   • Highest education level: Not on file   Tobacco Use   • Smoking status: Current Every Day Smoker     Packs/day: 1.00     Types: Cigarettes, Electronic Cigarette   • Smokeless tobacco: Never Used   Substance and Sexual Activity   • Alcohol use: No     Frequency: Never   • Drug use: No   • Sexual activity: Defer           Objective   Physical Exam   Constitutional: She is oriented to person, place, and time. She appears well-developed and well-nourished.  Non-toxic appearance. No distress.   HENT:   Head: Normocephalic and atraumatic.   Right Ear: External ear normal.   Left Ear: External ear normal.   Nose: Nose normal.   Mouth/Throat: Oropharynx is clear and moist and mucous membranes are normal. No oropharyngeal exudate. No tonsillar exudate.   Eyes: Pupils are equal, round, and reactive to light. Conjunctivae, EOM and lids are normal.   Neck: Normal range of motion and full passive range of motion without pain. Neck supple. No thyromegaly present.   Cardiovascular: Normal rate, regular rhythm, S1 normal, S2 normal, normal heart sounds, intact distal pulses and normal pulses.   Pulmonary/Chest: Effort normal. No tachypnea. No respiratory distress. She has decreased breath sounds. She has no wheezes. She has rhonchi. She has no rales. She exhibits no tenderness.   Abdominal: Soft. Normal appearance and bowel sounds are normal. She exhibits no distension. There is no tenderness. There is no rebound and no guarding.   Musculoskeletal: Normal range of motion. She exhibits no edema, tenderness or deformity.   Lymphadenopathy:     She has no cervical adenopathy.    Neurological: She is alert and oriented to person, place, and time. She has normal strength. No cranial nerve deficit or sensory deficit. GCS eye subscore is 4. GCS verbal subscore is 5. GCS motor subscore is 6.   Skin: Skin is warm, dry and intact. No rash noted. She is not diaphoretic. No erythema. No pallor.   Psychiatric: She has a normal mood and affect. Her speech is normal and behavior is normal. Judgment and thought content normal. Cognition and memory are normal.   Nursing note and vitals reviewed.      Procedures           ED Course  ED Course as of Aug 05 1958   Wed Aug 05, 2020   0538 IMPRESSION:     1. There is no evidence of pneumonia or pleural effusion. Probable areas of bandlike atelectasis/scarring similar to the prior study.  2. 6 mm area of probable nodular pleural thickening in the right lung apex. Fleischner recommendations follow below.  3. Probable reactive mediastinal lymph nodes. No threshold adenopathy.  4. The abdomen and pelvis CT has been dictated separately.   CT Chest Without Contrast [ES]   0540 IMPRESSION:     1. There is no evidence of pneumonia or pleural effusion. Probable areas of bandlike atelectasis/scarring similar to the prior study.  2. 6 mm area of probable nodular pleural thickening in the right lung apex. Fleischner recommendations follow below.  3. Probable reactive mediastinal lymph nodes. No threshold adenopathy.  4. The abdomen and pelvis CT has been dictated separately.   CT Abdomen Pelvis Without Contrast [ES]   0616 IMPRESSION:     1. Hepatomegaly and hepatic steatosis. No evidence of ascites.  2. Normal appendix.  3. Nonspecific retroperitoneal pelvic and inguinal lymph nodes. These may be reactive but should be correlated clinically. Lymphoproliferative disease including lymphoma not excluded.  4. Redemonstration of injection of the subcutaneous fat which may reflect cellulitis or anasarca.   CT Abdomen Pelvis Without Contrast [ES]   0728 Normal sinus  rhythm  Normal ECG  When compared with ECG of 05-JUL-2020 07:01,  Nonspecific T wave abnormality no longer evident in Anterior leads  Vent. rate 74 BPM  OK interval 146 ms  QRS duration 78 ms  QT/QTc 392/435 ms  P-R-T axes 43 57 44   ECG 12 Lead [ES]   0905 Care assumed from Dr. Ludwig at shift change.  Reviewed results of work-up with patient.  She does have multiple areas of lymphadenopathy.  She says that she had similar lymph node enlargement about 10 to 15 years ago and had ultrasounds, but never had a biopsy.  She said at that time she did not want to know if she had cancer.  Have encouraged her to follow-up with surgery and have her biopsy as this may be a very treatable problem.  She says her doctor is making her an appointment with Dr. Chowdhury.  She also reports a history of normal pressure hydrocephalus complains of headache currently.  She says that she is going back to the neurologist for therapeutic lumbar puncture.  They have discussed shunt, but she has also refused that up until this point.    [BC]      ED Course User Index  [BC] Noe Francisco MD  [ES] Jaswant Ludwig MD                                           MDM  Number of Diagnoses or Management Options     Amount and/or Complexity of Data Reviewed  Clinical lab tests: reviewed and ordered  Tests in the radiology section of CPT®: reviewed and ordered  Tests in the medicine section of CPT®: reviewed and ordered  Review and summarize past medical records: yes  Discuss the patient with other providers: yes  Independent visualization of images, tracings, or specimens: yes    Risk of Complications, Morbidity, and/or Mortality  Presenting problems: moderate  Diagnostic procedures: moderate  Management options: moderate    Patient Progress  Patient progress: stable      Final diagnoses:   Lymphadenopathy   Acute nonintractable headache, unspecified headache type   Shortness of breath            Jaswant Ludwig MD  08/05/20  1958

## 2020-09-08 ENCOUNTER — OFFICE VISIT (OUTPATIENT)
Dept: SURGERY | Facility: CLINIC | Age: 45
End: 2020-09-08

## 2020-09-08 VITALS — BODY MASS INDEX: 51.91 KG/M2 | WEIGHT: 293 LBS | HEIGHT: 63 IN

## 2020-09-08 DIAGNOSIS — K21.9 GASTROESOPHAGEAL REFLUX DISEASE, ESOPHAGITIS PRESENCE NOT SPECIFIED: Chronic | ICD-10-CM

## 2020-09-08 DIAGNOSIS — R59.1 LYMPHADENOPATHY: Primary | ICD-10-CM

## 2020-09-08 PROCEDURE — 99203 OFFICE O/P NEW LOW 30 MIN: CPT | Performed by: SURGERY

## 2020-09-08 RX ORDER — PANTOPRAZOLE SODIUM 40 MG/1
40 TABLET, DELAYED RELEASE ORAL DAILY
Qty: 30 TABLET | Refills: 1 | Status: SHIPPED | OUTPATIENT
Start: 2020-09-08 | End: 2021-09-08

## 2020-09-08 RX ORDER — CYCLOBENZAPRINE HCL 10 MG
TABLET ORAL
COMMUNITY
Start: 2020-08-27

## 2020-09-09 NOTE — PROGRESS NOTES
Subjective   Raquel Ridley is a 45 y.o. female is being seen for consultation today at the request of Elsa Sharma APRN    Raquel Ridley is a 45 y.o. female Morbid obese female presenting with reflux and hoarseness.  She has difficulty swallowing likely due to swelling of the upper airway from reflux change in smoking.  She is morbidly obese with a body mass index of 57.2.  No previous PPI therapy reported.  No previous upper endoscopy.  She also has lymphadenopathy noted on imaging of this is not clinically palpable due to her obesity.  She has enlarged lymph nodes of the right axilla with the greatest being 1.4 cm in size.  Nontender.  No night sweats or type B symptoms otherwise.      Past Medical History:   Diagnosis Date   • Anxiety disorder 2020   • Arthritis    • CHF (congestive heart failure) (CMS/[facility])    • COPD (chronic obstructive pulmonary disease) (CMS/[facility])    • Diabetes mellitus (CMS/[facility])    • GERD (gastroesophageal reflux disease)    • Hyperlipidemia    • Hypertension    • Morbid obesity (CMS/[facility]) 2020   • Pseudotumor cerebri    • Restless leg    • Sleep apnea        Family History   Problem Relation Age of Onset   • Diabetes Mother    • Hypertension Mother    • Stroke Father    • Hypertension Father    • Diabetes Father    • Cancer Paternal Grandmother         breat and liver        Social History     Socioeconomic History   • Marital status: Legally      Spouse name: Not on file   • Number of children: Not on file   • Years of education: Not on file   • Highest education level: Not on file   Tobacco Use   • Smoking status: Current Every Day Smoker     Packs/day: 1.00     Types: Cigarettes, Electronic Cigarette   • Smokeless tobacco: Never Used   Substance and Sexual Activity   • Alcohol use: No     Frequency: Never   • Drug use: No   • Sexual activity: Defer       Past Surgical History:   Procedure Laterality Date   •  SECTION     • CHOLECYSTECTOMY     • TONSILLECTOMY     •  "TUBAL ABDOMINAL LIGATION         Review of Systems   Constitutional: Negative for activity change, appetite change, chills and fever.   HENT: Positive for trouble swallowing and voice change. Negative for sore throat.    Eyes: Negative for visual disturbance.   Respiratory: Negative for cough and shortness of breath.    Cardiovascular: Negative for chest pain and palpitations.   Gastrointestinal: Negative for abdominal distention, abdominal pain, blood in stool, constipation, diarrhea, nausea and vomiting.   Endocrine: Negative for cold intolerance and heat intolerance.   Genitourinary: Negative for dysuria.   Musculoskeletal: Negative for joint swelling.   Skin: Negative for color change, rash and wound.   Allergic/Immunologic: Negative for immunocompromised state.   Neurological: Negative for dizziness, seizures, weakness and headaches.   Hematological: Positive for adenopathy. Does not bruise/bleed easily.   Psychiatric/Behavioral: Negative for agitation and confusion.         Ht 160 cm (62.99\")   Wt (!) 147 kg (323 lb)   BMI 57.23 kg/m²   Objective   Physical Exam   Constitutional: She is oriented to person, place, and time. She appears well-developed.   HENT:   Head: Normocephalic and atraumatic.   Mouth/Throat: Mucous membranes are normal.   Eyes: Pupils are equal, round, and reactive to light. Conjunctivae are normal.   Neck: Neck supple. No JVD present. No tracheal deviation present. No thyromegaly present.   Cardiovascular: Normal rate and regular rhythm. Exam reveals no gallop and no friction rub.   No murmur heard.  Pulmonary/Chest: Effort normal and breath sounds normal.   Abdominal: Soft. She exhibits no distension. There is no splenomegaly or hepatomegaly. There is no tenderness. No hernia.   Musculoskeletal: Normal range of motion. She exhibits no deformity.   Neurological: She is alert and oriented to person, place, and time.   Skin: Skin is warm and dry.   Psychiatric: She has a normal mood and " affect.             Assessment   Raquel was seen today for abdominal pain.    Diagnoses and all orders for this visit:    Lymphadenopathy  -     US Axilla Right; Future    Gastroesophageal reflux disease, esophagitis presence not specified    Other orders  -     pantoprazole (Protonix) 40 MG EC tablet; Take 1 tablet by mouth Daily.      Raquel Ridley is a 45 y.o. female with dysphasia likely secondary to reflux and hoarseness.  She will attempt smoking cessation undergo ultrasound of the right axilla due to history of enlarged lymph nodes of the axilla and neck.  This will be used to determine if she can undergo image guided biopsy.  She will follow-up in 2 weeks to discuss imaging and see if Protonix has helped with reflux and hoarseness.  She is also been counseled extensively on weight loss.    Patient's Body mass index is 57.23 kg/m². BMI is above normal parameters. Recommendations include: educational material.

## 2020-09-12 ENCOUNTER — HOSPITAL ENCOUNTER (EMERGENCY)
Facility: HOSPITAL | Age: 45
Discharge: LEFT BEFORE DISCHARGE | End: 2020-09-12

## 2020-09-12 VITALS
HEIGHT: 63 IN | WEIGHT: 293 LBS | SYSTOLIC BLOOD PRESSURE: 133 MMHG | BODY MASS INDEX: 51.91 KG/M2 | RESPIRATION RATE: 18 BRPM | OXYGEN SATURATION: 94 % | HEART RATE: 107 BPM | TEMPERATURE: 99.5 F | DIASTOLIC BLOOD PRESSURE: 70 MMHG

## 2020-09-12 PROCEDURE — 93005 ELECTROCARDIOGRAM TRACING: CPT | Performed by: FAMILY MEDICINE

## 2020-09-12 PROCEDURE — 99211 OFF/OP EST MAY X REQ PHY/QHP: CPT

## 2020-09-12 PROCEDURE — 93010 ELECTROCARDIOGRAM REPORT: CPT | Performed by: INTERNAL MEDICINE

## 2020-09-13 NOTE — ED NOTES
Pt came to triage desk at this time stating she was not waiting any longer and she was leaving, pt asked to sign LWBS form, pt refused to sign form, pt was taken out of ED in a WC by her spouse.     Luciana Arnett RN  09/13/20 0797

## 2020-09-17 ENCOUNTER — TRANSCRIBE ORDERS (OUTPATIENT)
Dept: ADMINISTRATIVE | Facility: HOSPITAL | Age: 45
End: 2020-09-17

## 2020-09-17 DIAGNOSIS — R59.0 LOCALIZED ENLARGED LYMPH NODES: Primary | ICD-10-CM

## 2020-10-05 ENCOUNTER — TRANSCRIBE ORDERS (OUTPATIENT)
Dept: ADMINISTRATIVE | Facility: HOSPITAL | Age: 45
End: 2020-10-05

## 2020-10-05 DIAGNOSIS — R10.33 PERIUMBILICAL PAIN: Primary | ICD-10-CM

## 2021-03-15 ENCOUNTER — BULK ORDERING (OUTPATIENT)
Dept: CASE MANAGEMENT | Facility: OTHER | Age: 46
End: 2021-03-15

## 2021-03-15 DIAGNOSIS — Z23 IMMUNIZATION DUE: ICD-10-CM

## 2021-04-02 ENCOUNTER — TRANSCRIBE ORDERS (OUTPATIENT)
Dept: ADMINISTRATIVE | Facility: HOSPITAL | Age: 46
End: 2021-04-02

## 2021-04-02 DIAGNOSIS — M62.81 MUSCLE WEAKNESS (GENERALIZED): Primary | ICD-10-CM

## 2021-08-05 ENCOUNTER — TRANSCRIBE ORDERS (OUTPATIENT)
Dept: ADMINISTRATIVE | Facility: HOSPITAL | Age: 46
End: 2021-08-05

## 2021-08-05 DIAGNOSIS — Z01.818 PRE-OPERATIVE CLEARANCE: Primary | ICD-10-CM

## 2021-08-24 ENCOUNTER — TRANSCRIBE ORDERS (OUTPATIENT)
Dept: ADMINISTRATIVE | Facility: HOSPITAL | Age: 46
End: 2021-08-24

## 2021-08-24 DIAGNOSIS — Z01.818 PRE-OPERATIVE CLEARANCE: Primary | ICD-10-CM

## 2021-12-21 NOTE — H&P
"     [facility] Medicine Services  HISTORY & PHYSICAL    Patient Identification:  Name:  Raquel Ridley  Age:  44 y.o.  Sex:  female  :  1975  MRN:  [id number]   Visit Number:  [id number]  Primary Care Physician:  Elsa Sharma APRN     Subjective     Chief complaint:   Chief Complaint   Patient presents with   • Chest Pain   • Shortness of Breath   • Bloated   • Leg Swelling     History of presenting illness:   Patient is a 44 y.o. female with past medical history significant for congestive heart failure, GERD, hyperlipidemia, essential hypertension, pseudotumor cerebri, restless leg syndrome, anxiety, morbid obesity, that presented to the [facility] emergency department for evaluation of generalized edema, shortness of breath, and chest pain.    It should be of note that the patient is able to provide history of presenting illness but is a poor historian.  She states that she has been experiencing generalized edema and \"smothering\" for around 4 days that has progressively worsened.  She admits to being seen at [facility] on several different occasions but was unsure of when they occurred.  Records from [facility] were obtained and it appears that the patient was seen in the emergency department on 2020 for shortness of breath and was diagnosed with an acute COPD exacerbation.  It was recommended to the patient at that time that she be admitted to their hospitalist services but she declined.  The patient was once again seen on 2020 and was diagnosed with acute COPD exacerbation. On this presentation, the patient agreed to be admitted, however, she left AGAINST MEDICAL ADVICE once arriving to the floor.  She reports that she left [facility] because she was being tested for COVID and did not want to remain in isolation.  She reports that the COVID test was negative.  The patient states that her symptoms have not improved so she decided to " come to the emergency room at [facility] for further evaluation.  She reports that her generalized edema and shortness of breath have worsened over the past 4 days and denies being on any antibiotics in the outpatient setting.  She did recently visit her primary care provider at some point last week (patient once again unsure of when) and states she was given p.o. prednisone, which she has been taking.  She states that ambulation makes the shortness of breath worse and she has recently been given oxygen to wear at home (2 L nasal cannula).  She further admits to chronic night sweats, unintentional weight loss (22 pounds over the past 2 weeks), chronic lymphadenopathy, orthopnea, palpitations, dizziness, lightheadedness, nausea, decreased sleep due to PTSD, and chest pain.  She reports that the chest pain began around 2 days ago and is located in her left shoulder with radiation down her left arm.  She states that the chest pain will wax and wane and occurs at rest.  She describes the pain as an ache and nothing seems to make it better or worse but it is associated with diaphoresis and nausea.  She denies a cardiac history and is unsure if anyone in her family has ever had a heart attack.  She does admit to her paternal grandmother having liver and breast cancer.  The patient admits to smoking around 1 pack of cigarettes a day in addition to vaping.  She denies any illicit drug use or alcohol use.  The patient reports that has not consistently seen a primary care provider and states that she was supposed to have a biopsy of her lymph nodes done around 10 years ago which was never performed.    Upon arrival to the ED, vitals were temperature 99.3 °F, pulse 99, respirations 20, /69, SPO2 98% on 3 L nasal cannula.  Troponin T negative x2.  ABG with pH 7.468, PCO2 51.0, PO2 70.0, HCO3 36.9, O2 saturation 94.6 on 2 L nasal cannula.  CMP with glucose 102, potassium 3.3, CO2 34.7, chloride 92, anion gap 16.3,  alkaline phosphatase 175.  Amylase 20, C-reactive protein 1.32, lactate 2.1, repeat 1.9, lipase 9.  CBC WBC 20.45, hemoglobin 11.8, MCH 25.3, MCHC 30.5.  Urinalysis unremarkable.  Blood cultures pending x2.  Chest x-ray shows mild increase in cardiac silhouette size central vascular and interstitial markings with prior study raising concern for mild volume overload, patchy airspace disease at the lung bases likely some patchy pulmonary edema.  CT of abdomen/pelvis without contrast shows lymphadenopathy in the retroperitoneum and extending into the bilateral inguinal regions, mild stranding of the mesenteric fat in general with some prominent mesenteric lymph nodes, extensive edema in the subcutaneous fat of the abdomen and most prominent anteriorly, no ascites, diffuse fatty infiltration of the liver, postcholecystectomy.  CTA chest without contrast shows prominent pericardial fat pads which account for the obscuration of the heart border on the plain film, cardiac silhouette size is normal, no acute appearing infiltrate, no acute congestive failure noted, small amount of dependent atelectasis.  Bilateral lower extremity venous ultrasound shows no convincing evidence for acute appearing DVT, unable to visualize the peroneal veins or midportion posterior tibial veins or anterior tibial veins due to patient's morbid obesity, evidence for soft tissue edema.  EKG read by Dr. Coyne, cardiology, shows normal sinus rhythm, heart rate 90 bpm, QTc 440 MS.    In the emergency department the patient received IV Toradol 30 mg, IV Ativan 1 mg, IV morphine 4 mg, IV vancomycin, IV Zosyn.    Patient has been admitted to the telemetry floor for further evaluation and treatment.  ---------------------------------------------------------------------------------------------------------------------   Review of Systems   Constitutional: Positive for unexpected weight change (22 pounds over the past 2 weeks). Negative for chills,  diaphoresis and fever.        Admits to night sweats   HENT: Negative for congestion and sore throat.    Eyes: Negative for discharge and visual disturbance.   Respiratory: Positive for shortness of breath (Worse with ambulation, orthopnea). Negative for cough and wheezing.    Cardiovascular: Positive for chest pain (Left shoulder with radiation down left arm) and leg swelling (Bilateral). Negative for palpitations.   Gastrointestinal: Positive for nausea. Negative for abdominal pain, constipation, diarrhea and vomiting.   Endocrine: Negative for polydipsia and polyuria.   Genitourinary: Negative for dysuria, frequency and urgency.   Musculoskeletal: Positive for arthralgias (Chronic leg pain). Negative for gait problem.   Skin: Negative for rash and wound.   Neurological: Positive for dizziness, light-headedness and headaches. Negative for syncope and numbness.   Hematological: Positive for adenopathy (Chronic inguinal). Does not bruise/bleed easily.   Psychiatric/Behavioral: Positive for sleep disturbance (Unable to sleep due to PTSD). Negative for confusion. The patient is not nervous/anxious.       ---------------------------------------------------------------------------------------------------------------------   Past Medical History:   Diagnosis Date   • Anxiety disorder 2020   • Arthritis    • CHF (congestive heart failure) (CMS/[facility])    • COPD (chronic obstructive pulmonary disease) (CMS/[facility])    • Diabetes mellitus (CMS/[facility])    • GERD (gastroesophageal reflux disease)    • Hyperlipidemia    • Hypertension    • Morbid obesity (CMS/[facility]) 2020   • Pseudotumor cerebri    • Restless leg    • Sleep apnea      Past Surgical History:   Procedure Laterality Date   •  SECTION     • CHOLECYSTECTOMY     • TONSILLECTOMY     • TUBAL ABDOMINAL LIGATION       Family History   Problem Relation Age of Onset   • Diabetes Mother    • Hypertension Mother    • Stroke Father    • Hypertension Father    • Diabetes  Father    • Cancer Paternal Grandmother         breat and liver      Social History     Socioeconomic History   • Marital status: Legally      Spouse name: Not on file   • Number of children: Not on file   • Years of education: Not on file   • Highest education level: Not on file   Tobacco Use   • Smoking status: Current Every Day Smoker     Packs/day: 1.00     Types: Cigarettes, Electronic Cigarette   • Smokeless tobacco: Never Used   Substance and Sexual Activity   • Alcohol use: No     Frequency: Never   • Drug use: No   • Sexual activity: Defer     ---------------------------------------------------------------------------------------------------------------------   Allergies:  Erythromycin  ---------------------------------------------------------------------------------------------------------------------   Medications below are reported home medications pulling from within the system; at this time, these medications have not been reconciled unless otherwise specified and are in the verification process for further verifcation as current home medications.    Prior to Admission Medications     Prescriptions Last Dose Informant Patient Reported? Taking?    amitriptyline (ELAVIL) 75 MG tablet  Pharmacy Yes No    Take 75 mg by mouth Every Night.    aspirin 81 MG EC tablet   No No    Take 1 tablet by mouth Daily.    atorvastatin (LIPITOR) 40 MG tablet   No No    Take 1 tablet by mouth Every Night.    buprenorphine-naloxone (SUBOXONE) 8-2 MG film film  Pharmacy Yes No    Place 2 films under the tongue Daily.    buPROPion SR (WELLBUTRIN SR) 150 MG 12 hr tablet  Pharmacy Yes No    Take 150 mg by mouth 2 (Two) Times a Day.    chlorthalidone (HYGROTON) 25 MG tablet  Pharmacy Yes No    Take 25 mg by mouth Daily.    CloNIDine (CATAPRES) 0.1 MG tablet  Pharmacy Yes No    Take 0.1 mg by mouth 2 (Two) Times a Day.    FLUoxetine (PROzac) 20 MG capsule  Pharmacy Yes No    Take 60 mg by mouth Daily.    losartan  (COZAAR) 100 MG tablet  Pharmacy Yes No    Take 100 mg by mouth Daily.    omeprazole (priLOSEC) 20 MG capsule  Pharmacy Yes No    Take 20 mg by mouth Daily.    prazosin (MINIPRESS) 5 MG capsule  Pharmacy Yes No    Take 5 mg by mouth Every Night.    rOPINIRole (REQUIP) 3 MG tablet  Pharmacy Yes No    Take 3 mg by mouth 2 (Two) Times a Day As Needed.    tiZANidine (ZANAFLEX) 4 MG tablet  Pharmacy Yes No    Take 4 mg by mouth Every 8 (Eight) Hours As Needed for Muscle Spasms.    zolpidem (AMBIEN) 10 MG tablet  Pharmacy Yes No    Take 10 mg by mouth At Night As Needed for Sleep.        ---------------------------------------------------------------------------------------------------------------------    Objective     Hospital Scheduled Meds:    aspirin 324 mg Oral Once   aspirin 81 mg Oral Daily   atorvastatin 40 mg Oral Nightly   heparin (porcine) 5,000 Units Subcutaneous Q12H   pantoprazole 40 mg Oral Q AM   sodium chloride 10 mL Intravenous Q12H          Current listed hospital scheduled medications may not yet reflect those currently placed in orders that are signed and held, awaiting patient's arrival to floor/unit.    ---------------------------------------------------------------------------------------------------------------------   Vital Signs:  Temp:  [98 °F (36.7 °C)-99.3 °F (37.4 °C)] 98 °F (36.7 °C)  Heart Rate:  [] 62  Resp:  [17-20] 20  BP: (105-135)/(62-79) 109/62  No data found.  SpO2 Percentage    07/04/20 2226 07/04/20 2332 07/04/20 2345   SpO2: 96% 93% 92%     SpO2:  [92 %-98 %] 92 %  on  Flow (L/min):  [2-3] 2;        Body mass index is 60.18 kg/m².  Wt Readings from Last 3 Encounters:   07/04/20 (!) 154 kg (339 lb 11.2 oz)   08/19/19 (!) 168 kg (370 lb)     ---------------------------------------------------------------------------------------------------------------------   Physical Exam:  Physical Exam   Constitutional: She is oriented to person, place, and time. She appears  well-developed and well-nourished. She is cooperative. Nasal cannula in place.   Upon evaluation patient was sitting up in bed.  Appears to be restless/anxious, no acute distress noted.  2.5 L nasal cannula in place.   HENT:   Head: Normocephalic and atraumatic.   Nose: Nose normal. No nasal deformity.   Eyes: Conjunctivae and lids are normal. Right eye exhibits no discharge. Left eye exhibits no discharge. Right conjunctiva is not injected. Left conjunctiva is not injected.   Neck: No JVD present. Carotid bruit is not present.   Cardiovascular: Normal rate, regular rhythm, normal heart sounds, intact distal pulses and normal pulses. Exam reveals no gallop and no friction rub.   No murmur heard.  Pulses:       Popliteal pulses are 2+ on the right side, and 2+ on the left side.        Dorsalis pedis pulses are 2+ on the right side, and 2+ on the left side.   Pulmonary/Chest: Effort normal. No tachypnea and no bradypnea. No respiratory distress. She has decreased breath sounds (Diffuse). She has no wheezes. She has no rhonchi. She has no rales.   Abdominal: Soft. Normal appearance and bowel sounds are normal. She exhibits no distension, no ascites and no mass. There is no tenderness. There is no rigidity and no guarding.   Increased body habitus   Musculoskeletal:        Right lower leg: She exhibits edema (1+).        Left lower leg: She exhibits edema (1+).     Vascular Status -  Her right foot exhibits normal foot vasculature  and no edema. Her left foot exhibits normal foot vasculature  and no edema.  Skin Integrity  -  Her right foot skin is intact.Her left foot skin is intact..  Lymphadenopathy:        Head (right side): No submental, no submandibular, no tonsillar, no preauricular, no posterior auricular and no occipital adenopathy present.        Head (left side): No submental, no submandibular, no tonsillar, no preauricular, no posterior auricular and no occipital adenopathy present.     She has no cervical  adenopathy.        Right cervical: No superficial cervical adenopathy present.       Left cervical: No superficial cervical adenopathy present.     She has no axillary adenopathy.        Right: No inguinal and no supraclavicular adenopathy present.        Left: Inguinal (Possible mild lymphadenopathy, difficult to determine due to patient's increased body habitus) adenopathy present. No supraclavicular adenopathy present.   Neurological: She is alert and oriented to person, place, and time. She has normal strength. She is not disoriented (Alert and oriented to herself, place, month, year). She displays no atrophy. No sensory deficit.   Skin: No abrasion and no petechiae noted. No erythema.   Psychiatric: Her speech is normal. Her mood appears anxious. She is agitated. Cognition and memory are normal. Cognition and memory are not impaired. She does not exhibit a depressed mood.     ---------------------------------------------------------------------------------------------------------------------  EKG:    Read by Dr. Coyne, normal sinus rhythm, heart rate 90 bpm, QTc 440 MS.    Telemetry:    Normal sinus rhythm, heart rate 70 bpm SPO2 90% on 2.5 L nasal cannula.    I have personally reviewed the EKG/Telemetry strip  ---------------------------------------------------------------------------------------------------------------------   Results from last 7 days   Lab Units 07/04/20  1801 07/04/20  1459   TROPONIN T ng/mL <0.010 <0.010     Results from last 7 days   Lab Units 07/04/20  1459   PROBNP pg/mL 94.9       Results from last 7 days   Lab Units 07/04/20  1606   PH, ARTERIAL pH units 7.468*   PO2 ART mm Hg 70.0*   PCO2, ARTERIAL mm Hg 51.0*   HCO3 ART mmol/L 36.9*     Results from last 7 days   Lab Units 07/04/20  1910 07/04/20  1459   CRP mg/dL  --  1.22*   LACTATE mmol/L 1.9 2.1*   WBC 10*3/mm3  --  20.45*   HEMOGLOBIN g/dL  --  11.8*   HEMATOCRIT %  --  38.7   MCV fL  --  82.9   MCHC g/dL  --  30.5*    PLATELETS 10*3/mm3  --  421     Results from last 7 days   Lab Units 07/04/20  1459   SODIUM mmol/L 143   POTASSIUM mmol/L 3.3*   CHLORIDE mmol/L 92*   CO2 mmol/L 34.7*   BUN mg/dL 12   CREATININE mg/dL 0.85   EGFR IF NONAFRICN AM mL/min/1.73 73   CALCIUM mg/dL 9.0   GLUCOSE mg/dL 102*   ALBUMIN g/dL 4.07   BILIRUBIN mg/dL 0.3   ALK PHOS U/L 175*   AST (SGOT) U/L 15   ALT (SGPT) U/L 18   Estimated Creatinine Clearance: 124 mL/min (by C-G formula based on SCr of 0.85 mg/dL).  No results found for: AMMONIA    No results found for: HGBA1C, POCGLU  No results found for: HGBA1C  Lab Results   Component Value Date    TSH 3.550 08/19/2019       No results found for: BLOODCX  No results found for: URINECX  No results found for: WOUNDCX  No results found for: STOOLCX  No results found for: RESPCX  No results found for: MRSACX  Pain Management Panel     There is no flowsheet data to display.        I have personally reviewed the above laboratory results.   ---------------------------------------------------------------------------------------------------------------------  Imaging Results (Last 7 Days)     Procedure Component Value Units Date/Time    US Venous Doppler Lower Extremity Bilateral (duplex) [[id number]] Collected:  07/04/20 1900     Updated:  07/04/20 1902    Narrative:       US Veins LE Duplex BILAT    HISTORY:   Swelling redness and pain bilateral lower extremities. Morbid obesity. ER evaluation.    TECHNIQUE:   Real-time ultrasound was performed of both lower extremities utilizing spectral and color Doppler with compression and augmentation techniques.    COMPARISON:  Doppler venous lower extremity bilateral from 8/19/2019.    FINDINGS:  Right Lower Extremity:  There is no deep venous thrombus seen in the right lower extremity, including the right common femoral veins, right femoral veins and right popliteal veins.  Normal compressibility and respiratory phasicity was visualized.  No calf vein  thrombus.    Left Lower Extremity:  There is no deep venous thrombus seen in the left lower extremity, including the left common femoral veins, left femoral veins and left popliteal veins.   Normal compressibility and respiratory phasicity was visualized.  No calf vein thrombus.    Technologist notes that they were unable to visualize the peroneal veins or mid posterior tibial veins on either side due to the patient's morbid obesity and edema. This is a limited exam.        Impression:       Limited evaluation of the bilateral lower extremity deep venous system shows no convincing evidence for acute appearing deep venous thrombosis. Technologist was unable to visualize the peroneal veins or mid portion posterior tibial veins or anterior  tibial veins secondary to the patient's morbid obesity. There is evidence for soft tissue edema.    Signer Name: Veronica Cardozo MD   Signed: 7/4/2020 7:00 PM   Workstation Name: [email or web address]    [facility]    CT Abdomen Pelvis Without Contrast [[id number]] Collected:  07/04/20 1837     Updated:  07/04/20 1839    Narrative:       INDICATION:   Abdominal swelling. ER evaluation.    TECHNIQUE:   CT of the abdomen and pelvis without contrast. Coronal and sagittal reconstructions were obtained.  Radiation dose reduction techniques included automated exposure control or exposure modulation based on body size. Radiation audit for number of CT and  nuclear cardiology exams performed in the last year: 2.      COMPARISON:   See the accompanying chest CT dictation.    FINDINGS:  Lung bases: 80 accounting chest CT dictation.    Abdomen: Lack of IV contrast media limits the study.    There is a large amount of soft tissue edema seen in the anterior, lateral, and posterior abdominal subcutaneous fat .    There is mild diffuse fatty infiltration of liver. The spleen is unremarkable. The patient is postcholecystectomy. The pancreas is largely fatty replaced. The adrenal glands are  unremarkable. There is no evidence for intrarenal calculus or  hydronephrosis.    There is no evidence for bowel obstruction. Appendix is unremarkable.    There is lymphadenopathy in the retroperitoneum and inguinal regions bilaterally. There our smaller mesenteric lymph nodes and stranding in the mesentery in general. This should be further evaluated. Correlation with an abdomen pelvis CT scan with IV and  oral contrast media recommended. Please correlate for any clinical concern for lymphoma. Lymphadenopathy is nonspecific at this time. I other neoplastic, infectious, or inflammatory disease is also the differential. Tissue diagnosis may be helpful.  Largest left inguinal node is about 1.3 x 2 cm dimension. Largest right inguinal node is about 1.5 x 2 cm dimension. There is a left para-aortic node just below the left renal artery at its about 1.9 x 1.5 cm dimension. There is a right common  Iliac chain node that is about 1.3 cm in diameter. The right external iliac chain lymph node that is about 1.3 cm short axis dimension.    There is degenerative change in the spine.    Pelvis: Bladder is unremarkable. Uterus and adnexal structures are unremarkable.      Impression:         1. There is lymphadenopathy in the retroperitoneum and extending into the bilateral inguinal regions. There is also mild stranding of the mesenteric fat in general with some prominent mesenteric lymph nodes. This should be further evaluated. Please  correlate for any clinical history of or evidence for lymphoma. Other neoplastic infectious or inflammatory diseases are all in the differential.  2. There is extensive edema in the subcutaneous fat of the abdomen most prominent anteriorly.  3. There is no ascites. There is diffuse fatty infiltration of the liver.  4. Postcholecystectomy.  5. Study is limited by lack of IV and oral contrast media. Follow-up study with both oral and IV contrast media may be helpful.        Signer Name: Veronica  Juliane SEGOVIA   Signed: 7/4/2020 6:37 PM   Workstation Name: [facility]    [facility]    CT Chest Without Contrast [[id number]] Collected:  07/04/20 1826     Updated:  07/04/20 1828    Narrative:       INDICATION:    Chest pain and short of breath ER evaluation    TECHNIQUE:   CT of the chest without contrast. Coronal and sagittal reconstructions were obtained.  Radiation dose reduction techniques included automated exposure control or exposure modulation based on body size. Radiation audit for number of CT and nuclear  cardiology exams performed in the last year: 2.      COMPARISON:    Earlier chest x-ray and CT chest PE protocol from 8/19/2019.    FINDINGS:  Study is limited by lack of IV contrast media. There is greater than no axillary mediastinal or definite hilar adenopathy allowing for the lack of IV contrast media. There is no pleural or pericardial effusion. Heart size is within normal limits. There  is a prominent right and left anterior pericardial fat pad which results in the obscuration of the heart border on the frontal view and account for the abnormalities on the plain film. There is no evidence for acute infiltrate. There is a small amount of  dependent atelectasis.    See separate abdomen pelvis CT dictation. There is a small sclerotic lesion in the sternum which is stable and likely a bone island.      Impression:         1. This patient has prominent pericardial fat pads which account for the obscuration of the heart border on the plain film. Cardiac silhouette size is normal and there is no acute-appearing infiltrate. No acute congestive failure appreciated. There is a  small amount of dependent atelectasis.  2. Please refer the separate abdomen pelvis CT dictation.    Signer Name: Veronica Cardozo MD   Signed: 7/4/2020 6:26 PM   [email or web address]    [facility]    XR Chest 1 View [[id number]] Collected:  07/04/20 1555     Updated:  07/04/20 1553     Narrative:       CR Chest 1 Vw    INDICATION:   Chest pain short of breath     COMPARISON:    Chest x-ray 8/19/2019.    FINDINGS:  Single portable AP view(s) of the chest.  Cardiac silhouette is mildly enlarged and increased from prior. There is subtle increase in central vascular and interstitial markings from prior with development of patchy bibasilar airspace disease. Please  correlate for clinical evidence of subtle volume overload. No obvious pleural effusion. No pneumothorax.      Impression:       Mild increase in cardiac silhouette size central vascular and interstitial markings as prior study raising concern for mild volume overload. Patchy airspace disease at the lung bases likely some patchy pulmonary edema. Follow-up recommended to ensure  resolution    Signer Name: Veronica Cardozo MD   Signed: 7/4/2020 3:55 PM   Workstation Name: [email or web address]    [facility]        I have personally reviewed the above radiology results.      ---------------------------------------------------------------------------------------------------------------------    Assessment & Plan      Active Hospital Problems    Diagnosis POA   • **Edema [R60.9] Yes   • Morbid obesity (CMS/HCC) [E66.01] Yes   • CHF (congestive heart failure) (CMS/HCC) [I50.9] Yes   • GERD (gastroesophageal reflux disease) [K21.9] Yes   • Anxiety disorder [F41.9] Yes   • Hyperlipidemia [E78.5] Yes   • Essential hypertension [I10] Yes   • Pseudotumor cerebri [G93.2] Yes   • Restless leg [G25.81] Yes     · SIRs criteria met upon admission due to unknown etiology at this time: Technically meets severe sepsis criteria with pulse 100, respirations 20, WBC 20.45, lactate 2.1, however, repeat lactate is 1.9 and no source of infection.  Questionable as to whether leukocytosis is due to p.o. steroids received in the outpatient setting.  Skin exam revealed no evidence of cellulitis.  CT of abdomen/pelvis shows extensive edema in the subcutaneous  fat and lymphadenopathy in the bilateral inguinal regions but no obvious source of infection.  CT of chest also shows no consolidation.  Chest x-ray unremarkable as well.  UA unremarkable.  Patient received IV vancomycin and IV Zosyn in the emergency department.  We will hold on further antibiotics at this time.  Blood cultures currently pending x2.  Repeat a.m. CBC, lactate, CRP.  Monitor vitals closely.  Currently afebrile, continue monitor temperature curve.     · Bilateral inguinal lymphadenopathy with reported history of chronic lymphadenopathy: Upon palpation there was some mild left-sided inguinal lymphadenopathy, however, difficult to determine due to the patient's increased body habitus.  CT of the abdomen/pelvis did reveal left inguinal node measuring 1.3 x 2 cm, right inguinal node 1.5 x 2 cm, left para-aortic node just below the left renal artery measuring 1.9 x 1.5 cm, right common iliac chain measuring 1.3 cm in diameter and right external iliac chain measuring 1.3 cm in dimension.  She reports that she has a history of chronic lymphadenopathy was supposed to have a biopsy around 10 years ago that was never performed.  We will consult general surgery for possible biopsy, input/assistance is much appreciated.  Patient is to be n.p.o.  CBC is unremarkable.  Repeat a.m. CBC.  Continue to monitor closely.    · Chest pain with mixed features: Currently chest pain-free.  EKG shows no acute ischemic changes.  Troponin negative x2.  We will continue to trend troponin and obtain serial EKGs.  Patient had a stress test in August 2019 that was low risk.  Transthoracic echo has been ordered.  Cardiology consulted, input/assistance is much appreciated.  Give loading dose aspirin now and continue with daily aspirin.  Continue patient's home Lipitor 40 mg nightly.  Fasting a.m. lipid panel ordered.    · Mild hypokalemia: 3.3 on admission.  Will obtain repeat potassium level.  If still hypokalemic, potassium  replacement protocol will be ordered.    · Elevated alkaline phosphatase suspect 2/2 to fatty infiltration of the liver: Continue patient's home Lipitor 40 mg nightly once reconciled per pharmacy.  Repeat a.m. CMP.    · Microcytic anemia: H&H stable.  Repeat a.m. CBC and monitor H&H closely.  Obtain iron, folate, ferritin, B12, iron panel.  Replace as necessary.  If hemoglobin less than 7 will transfuse.    · Chronic hypoxia with acute hypercarbia that is compensated: Repeat a.m. ABG.  Currently on 2.5 L nasal cannula.  Continue with supplemental oxygen PRN, titrate to maintain SPO2 greater than or equal to 90%.    · Congestive heart failure: CT of chest shows no acute congestive heart failure.  Chest x-ray shows some patchy airspace disease at the lung bases, likely patchy pulmonary edema.  Does not appear decompensated at this time.  No transthoracic echo on file.  Echo has been ordered.  Will hold on diuresis for now.  Monitor urine output with strict I's and O's.  Obtain daily weights.    · Patient reported history of type 2 diabetes mellitus: Current home medication list does not show any antihyperglycemics.  Glucose on admission 102.  Hemoglobin A1c ordered.    · Obesity hypoventilation syndrome: Supplemental oxygen PRN.  Titrate to maintain SPO2 greater than or equal to 90%.    · Essential hypertension: BP has been borderline hypotensive in ED. We will hold home antihypertensive medications for now. Continue to monitor BP per hospital protocol.     · Hyperlipidemia: Fasting a.m. lipid panel ordered. Continue home Lipitor 40 mg nightly.     · History of pseudotumor cerebri: Continue outpatient follow up with PCP.     · Restless leg syndrome: continue home Requip once reconciled per pharmacy.     · Anxiety: Continue home medications once reconciled per pharmacy. Supportive care.     · GERD: Protonix     · ? History of substance abuse: Britton reviewed. Patient appears to be prescribed Suboxone. UDS ordered. Plan  to continue Suboxone once reconciled per pharmacy.     Smoking tobacco use: Raquel Ridley  reports that she has been smoking cigarettes and electronic cigarette. She has been smoking about 1.00 pack per day. She has never used smokeless tobacco.. I have educated her on the risk of diseases from using tobacco products such as cancer, COPD and heart diease. I advised her to quit and she is not willing to quit. I spent 4 minutes counseling the patient. Nicotine patch ordered.     · Morbid obesity: BMI 60.18 kg/m2     · F/E/N: No IV fluids.  Replace electrolytes as necessary.  N.p.o.    ---------------------------------------------------  DVT Prophylaxis: Subcutaneous heparin  GI Prophylaxis: Protonix   Activity: Up with assistance, fall precautions     The patient is considered to be a high risk patient due to: generalized edema, severe sepsis present upon admission due to unknown etiology, bilateral inguinal lymphadenopathy     INPATIENT status due to the need for care which can only be reasonably provided in an hospital setting such as aggressive/expedited ancillary services and/or consultation services, the necessity for IV medications, close physician monitoring and/or the possible need for procedures.  In such, I feel patient’s risk for adverse outcomes and need for care warrant INPATIENT evaluation and predict the patient’s care encounter to likely last beyond 2 midnights.    Code Status: FULL CODE     I have discussed the patient's assessment and plan with the patient, nursing staff, and attending physician      Ella Tang PA-C  Hospitalist Service -- [facility]       07/05/20  02:09    Attending Physician: Leelee Padgett DO      Patient baseline mental status

## 2021-12-31 ENCOUNTER — LAB (OUTPATIENT)
Dept: LAB | Facility: HOSPITAL | Age: 46
End: 2021-12-31

## 2021-12-31 DIAGNOSIS — Z01.818 PRE-OPERATIVE CLEARANCE: ICD-10-CM

## 2022-02-19 ENCOUNTER — APPOINTMENT (OUTPATIENT)
Dept: CT IMAGING | Facility: HOSPITAL | Age: 47
End: 2022-02-19

## 2022-02-19 ENCOUNTER — APPOINTMENT (OUTPATIENT)
Dept: ULTRASOUND IMAGING | Facility: HOSPITAL | Age: 47
End: 2022-02-19

## 2022-02-19 ENCOUNTER — APPOINTMENT (OUTPATIENT)
Dept: GENERAL RADIOLOGY | Facility: HOSPITAL | Age: 47
End: 2022-02-19

## 2022-02-19 ENCOUNTER — HOSPITAL ENCOUNTER (EMERGENCY)
Facility: HOSPITAL | Age: 47
Discharge: HOME | End: 2022-02-19
Attending: EMERGENCY MEDICINE | Admitting: EMERGENCY MEDICINE

## 2022-02-19 VITALS
BODY MASS INDEX: 51.91 KG/M2 | WEIGHT: 293 LBS | OXYGEN SATURATION: 95 % | DIASTOLIC BLOOD PRESSURE: 68 MMHG | HEIGHT: 63 IN | TEMPERATURE: 98.9 F | SYSTOLIC BLOOD PRESSURE: 109 MMHG | RESPIRATION RATE: 24 BRPM | HEART RATE: 82 BPM

## 2022-02-19 DIAGNOSIS — R07.9 CHRONIC CHEST PAIN: ICD-10-CM

## 2022-02-19 DIAGNOSIS — G89.29 CHRONIC CHEST PAIN: ICD-10-CM

## 2022-02-19 DIAGNOSIS — R59.1 LYMPHADENOPATHY: ICD-10-CM

## 2022-02-19 DIAGNOSIS — L03.115 CELLULITIS OF RIGHT LEG: Primary | ICD-10-CM

## 2022-02-19 LAB
ALBUMIN SERPL-MCNC: 3.43 G/DL (ref 3.5–5.2)
ALBUMIN/GLOB SERPL: 1 G/DL
ALP SERPL-CCNC: 166 U/L (ref 39–117)
ALT SERPL W P-5'-P-CCNC: 15 U/L (ref 1–33)
ANION GAP SERPL CALCULATED.3IONS-SCNC: 12.9 MMOL/L (ref 5–15)
AST SERPL-CCNC: 20 U/L (ref 1–32)
BASOPHILS # BLD AUTO: 0.02 10*3/MM3 (ref 0–0.2)
BASOPHILS NFR BLD AUTO: 0.2 % (ref 0–1.5)
BILIRUB SERPL-MCNC: <0.2 MG/DL (ref 0–1.2)
BUN SERPL-MCNC: 14 MG/DL (ref 6–20)
BUN/CREAT SERPL: 15.9 (ref 7–25)
CALCIUM SPEC-SCNC: 8.8 MG/DL (ref 8.6–10.5)
CHLORIDE SERPL-SCNC: 100 MMOL/L (ref 98–107)
CO2 SERPL-SCNC: 25.1 MMOL/L (ref 22–29)
CREAT SERPL-MCNC: 0.88 MG/DL (ref 0.57–1)
CRP SERPL-MCNC: 1.73 MG/DL (ref 0–0.5)
D DIMER PPP FEU-MCNC: 0.71 MCGFEU/ML (ref 0–0.5)
D-LACTATE SERPL-SCNC: 1.6 MMOL/L (ref 0.5–2)
DEPRECATED RDW RBC AUTO: 45.1 FL (ref 37–54)
EOSINOPHIL # BLD AUTO: 0.18 10*3/MM3 (ref 0–0.4)
EOSINOPHIL NFR BLD AUTO: 1.8 % (ref 0.3–6.2)
ERYTHROCYTE [DISTWIDTH] IN BLOOD BY AUTOMATED COUNT: 14.6 % (ref 12.3–15.4)
GFR SERPL CREATININE-BSD FRML MDRD: 69 ML/MIN/1.73
GLOBULIN UR ELPH-MCNC: 3.5 GM/DL
GLUCOSE SERPL-MCNC: 172 MG/DL (ref 65–99)
HCG SERPL QL: NEGATIVE
HCT VFR BLD AUTO: 34.6 % (ref 34–46.6)
HGB BLD-MCNC: 11.1 G/DL (ref 12–15.9)
HOLD SPECIMEN: NORMAL
IMM GRANULOCYTES # BLD AUTO: 0.04 10*3/MM3 (ref 0–0.05)
IMM GRANULOCYTES NFR BLD AUTO: 0.4 % (ref 0–0.5)
INR PPP: 0.89 (ref 0.9–1.1)
LIPASE SERPL-CCNC: 11 U/L (ref 13–60)
LYMPHOCYTES # BLD AUTO: 3.06 10*3/MM3 (ref 0.7–3.1)
LYMPHOCYTES NFR BLD AUTO: 31.3 % (ref 19.6–45.3)
MAGNESIUM SERPL-MCNC: 1.5 MG/DL (ref 1.6–2.6)
MCH RBC QN AUTO: 27.3 PG (ref 26.6–33)
MCHC RBC AUTO-ENTMCNC: 32.1 G/DL (ref 31.5–35.7)
MCV RBC AUTO: 85.2 FL (ref 79–97)
MONOCYTES # BLD AUTO: 0.48 10*3/MM3 (ref 0.1–0.9)
MONOCYTES NFR BLD AUTO: 4.9 % (ref 5–12)
NEUTROPHILS NFR BLD AUTO: 6 10*3/MM3 (ref 1.7–7)
NEUTROPHILS NFR BLD AUTO: 61.4 % (ref 42.7–76)
NRBC BLD AUTO-RTO: 0 /100 WBC (ref 0–0.2)
NT-PROBNP SERPL-MCNC: 94.4 PG/ML (ref 0–450)
PLATELET # BLD AUTO: 273 10*3/MM3 (ref 140–450)
PMV BLD AUTO: 10.4 FL (ref 6–12)
POTASSIUM SERPL-SCNC: 4 MMOL/L (ref 3.5–5.2)
PROT SERPL-MCNC: 6.9 G/DL (ref 6–8.5)
PROTHROMBIN TIME: 12.4 SECONDS (ref 12.8–14.5)
RBC # BLD AUTO: 4.06 10*6/MM3 (ref 3.77–5.28)
SODIUM SERPL-SCNC: 138 MMOL/L (ref 136–145)
TROPONIN T SERPL-MCNC: <0.01 NG/ML (ref 0–0.03)
TROPONIN T SERPL-MCNC: <0.01 NG/ML (ref 0–0.03)
WBC NRBC COR # BLD: 9.78 10*3/MM3 (ref 3.4–10.8)
WHOLE BLOOD HOLD SPECIMEN: NORMAL
WHOLE BLOOD HOLD SPECIMEN: NORMAL

## 2022-02-19 PROCEDURE — 71045 X-RAY EXAM CHEST 1 VIEW: CPT

## 2022-02-19 PROCEDURE — 87040 BLOOD CULTURE FOR BACTERIA: CPT | Performed by: NURSE PRACTITIONER

## 2022-02-19 PROCEDURE — 83880 ASSAY OF NATRIURETIC PEPTIDE: CPT | Performed by: NURSE PRACTITIONER

## 2022-02-19 PROCEDURE — 84484 ASSAY OF TROPONIN QUANT: CPT | Performed by: NURSE PRACTITIONER

## 2022-02-19 PROCEDURE — 71275 CT ANGIOGRAPHY CHEST: CPT

## 2022-02-19 PROCEDURE — 83735 ASSAY OF MAGNESIUM: CPT | Performed by: NURSE PRACTITIONER

## 2022-02-19 PROCEDURE — 93005 ELECTROCARDIOGRAM TRACING: CPT | Performed by: NURSE PRACTITIONER

## 2022-02-19 PROCEDURE — 83605 ASSAY OF LACTIC ACID: CPT | Performed by: NURSE PRACTITIONER

## 2022-02-19 PROCEDURE — 25010000002 ONDANSETRON PER 1 MG: Performed by: PHYSICIAN ASSISTANT

## 2022-02-19 PROCEDURE — 93010 ELECTROCARDIOGRAM REPORT: CPT | Performed by: INTERNAL MEDICINE

## 2022-02-19 PROCEDURE — 80053 COMPREHEN METABOLIC PANEL: CPT | Performed by: NURSE PRACTITIONER

## 2022-02-19 PROCEDURE — 93005 ELECTROCARDIOGRAM TRACING: CPT | Performed by: EMERGENCY MEDICINE

## 2022-02-19 PROCEDURE — 85025 COMPLETE CBC W/AUTO DIFF WBC: CPT | Performed by: NURSE PRACTITIONER

## 2022-02-19 PROCEDURE — 93970 EXTREMITY STUDY: CPT

## 2022-02-19 PROCEDURE — 96374 THER/PROPH/DIAG INJ IV PUSH: CPT

## 2022-02-19 PROCEDURE — 85610 PROTHROMBIN TIME: CPT | Performed by: NURSE PRACTITIONER

## 2022-02-19 PROCEDURE — 83690 ASSAY OF LIPASE: CPT | Performed by: NURSE PRACTITIONER

## 2022-02-19 PROCEDURE — 99284 EMERGENCY DEPT VISIT MOD MDM: CPT

## 2022-02-19 PROCEDURE — 84703 CHORIONIC GONADOTROPIN ASSAY: CPT | Performed by: NURSE PRACTITIONER

## 2022-02-19 PROCEDURE — 86140 C-REACTIVE PROTEIN: CPT | Performed by: NURSE PRACTITIONER

## 2022-02-19 PROCEDURE — 0 IOPAMIDOL PER 1 ML: Performed by: EMERGENCY MEDICINE

## 2022-02-19 PROCEDURE — 85379 FIBRIN DEGRADATION QUANT: CPT | Performed by: NURSE PRACTITIONER

## 2022-02-19 RX ORDER — DOXYCYCLINE 100 MG/1
100 CAPSULE ORAL 2 TIMES DAILY
Qty: 20 CAPSULE | Refills: 0 | Status: SHIPPED | OUTPATIENT
Start: 2022-02-19 | End: 2022-03-01

## 2022-02-19 RX ORDER — ACETAMINOPHEN 500 MG
1000 TABLET ORAL ONCE
Status: COMPLETED | OUTPATIENT
Start: 2022-02-19 | End: 2022-02-19

## 2022-02-19 RX ORDER — ONDANSETRON 2 MG/ML
4 INJECTION INTRAMUSCULAR; INTRAVENOUS ONCE
Status: COMPLETED | OUTPATIENT
Start: 2022-02-19 | End: 2022-02-19

## 2022-02-19 RX ORDER — SODIUM CHLORIDE 0.9 % (FLUSH) 0.9 %
10 SYRINGE (ML) INJECTION AS NEEDED
Status: DISCONTINUED | OUTPATIENT
Start: 2022-02-19 | End: 2022-02-19 | Stop reason: HOSPADM

## 2022-02-19 RX ADMIN — IOPAMIDOL 80 ML: 755 INJECTION, SOLUTION INTRAVENOUS at 08:13

## 2022-02-19 RX ADMIN — ONDANSETRON 4 MG: 2 INJECTION INTRAMUSCULAR; INTRAVENOUS at 08:58

## 2022-02-19 RX ADMIN — ACETAMINOPHEN 1000 MG: 500 TABLET ORAL at 08:29

## 2022-02-19 RX ADMIN — MAGNESIUM GLUCONATE 500 MG ORAL TABLET 400 MG: 500 TABLET ORAL at 08:29

## 2022-02-19 NOTE — ED NOTES
Pt requesting something for a headache, Ioana STERN made aware.     Adelia Price, FRAN  02/19/22 0837

## 2022-02-19 NOTE — ED NOTES
Faxed med release to [facility] for medical records.      Fara Alaniz, PCT  02/19/22 0808

## 2022-02-19 NOTE — ED PROVIDER NOTES
Subjective   Patient is a 46-year-old female with past medical history significant for CHF, COPD, diabetes mellitus, GERD, hyperlipidemia, hypertension, pseudotumor cerebri, sleep apnea, restless leg syndrome, arthritis, anxiety/depression.  She presents to the ED today with complaints of chest pain and right lower extremity pain and redness with warmth.  She states she is worried that she has a DVT to her right lower extremity.  She states that she has been seen recently at [facility].  She reports she has been having chest pain intermittently for the last year.  She reports that she is being seen by the cardiology group at [facility] and recently had an echocardiogram performed.  She has not received the results of the echo yet.  She reports she also has multiple enlarged lymph nodes and has had this for a year.  She states that she is supposed to have these biopsied but has not had that done yet.  She denies any other significant complaints or concerns at this time.          Review of Systems   Constitutional: Negative.  Negative for fatigue and fever.   HENT: Negative.  Negative for congestion.    Eyes: Negative.    Respiratory: Negative for shortness of breath.    Cardiovascular: Positive for chest pain.   Gastrointestinal: Negative.  Negative for abdominal pain, nausea and vomiting.   Endocrine: Negative.    Genitourinary: Negative.  Negative for dysuria.   Musculoskeletal: Negative.    Skin: Positive for color change.   Allergic/Immunologic: Negative.    Neurological: Negative.    Hematological: Negative.    Psychiatric/Behavioral: Negative.    All other systems reviewed and are negative.      Past Medical History:   Diagnosis Date   • Anxiety disorder 7/4/2020   • Arthritis    • CHF (congestive heart failure) (CMS/[facility])    • COPD (chronic obstructive pulmonary disease) (CMS/[facility])    • Diabetes mellitus (CMS/[facility])    • GERD (gastroesophageal reflux disease)    • Hyperlipidemia    • Hypertension     • Morbid obesity (CMS/HCC) 2020   • Pseudotumor cerebri    • Restless leg    • Sleep apnea        Allergies   Allergen Reactions   • Erythromycin Rash       Past Surgical History:   Procedure Laterality Date   •  SECTION     • CHOLECYSTECTOMY     • TONSILLECTOMY     • TUBAL ABDOMINAL LIGATION         Family History   Problem Relation Age of Onset   • Diabetes Mother    • Hypertension Mother    • Stroke Father    • Hypertension Father    • Diabetes Father    • Cancer Paternal Grandmother         breat and liver        Social History     Socioeconomic History   • Marital status: Legally    Tobacco Use   • Smoking status: Current Every Day Smoker     Packs/day: 1.00     Types: Cigarettes, Electronic Cigarette   • Smokeless tobacco: Never Used   Substance and Sexual Activity   • Alcohol use: No   • Drug use: No   • Sexual activity: Defer           Objective   Physical Exam  Vitals and nursing note reviewed.   Constitutional:       General: She is not in acute distress.     Appearance: She is well-developed. She is not diaphoretic.   HENT:      Head: Normocephalic and atraumatic.      Right Ear: External ear normal.      Left Ear: External ear normal.      Nose: Nose normal.      Mouth/Throat:      Mouth: Mucous membranes are moist.      Pharynx: Oropharynx is clear.   Eyes:      Conjunctiva/sclera: Conjunctivae normal.      Pupils: Pupils are equal, round, and reactive to light.   Neck:      Vascular: No JVD.      Trachea: No tracheal deviation.   Cardiovascular:      Rate and Rhythm: Normal rate and regular rhythm.      Heart sounds: Normal heart sounds. No murmur heard.      Pulmonary:      Effort: Pulmonary effort is normal. No respiratory distress.      Breath sounds: Normal breath sounds. No wheezing.   Abdominal:      General: Bowel sounds are normal.      Palpations: Abdomen is soft.      Tenderness: There is no abdominal tenderness.   Musculoskeletal:         General: No deformity. Normal  range of motion.      Cervical back: Normal range of motion and neck supple.        Legs:    Skin:     General: Skin is warm and dry.      Capillary Refill: Capillary refill takes less than 2 seconds.      Coloration: Skin is not pale.      Findings: Erythema present. No rash.   Neurological:      General: No focal deficit present.      Mental Status: She is alert and oriented to person, place, and time. Mental status is at baseline.      Cranial Nerves: No cranial nerve deficit.   Psychiatric:         Mood and Affect: Mood normal.         Behavior: Behavior normal.         Thought Content: Thought content normal.         Judgment: Judgment normal.         Procedures           ED Course  ED Course as of 02/19/22 0949   Sat Feb 19, 2022   0530 Requested medical records from [facility]. [MB]   0654 US Venous Doppler Lower Extremity Bilateral (duplex)  IMPRESSION:  No deep venous thrombus seen in either lower extremity. [MB]   0726 ECG 7:17 NSR, rate 72. Normal ECG. QT/QTc 420/459 [[place]]   0758 Report given to Ioana STERN [MB]   0800 EKG normal sinus rhythm 82 bpm no ST segment elevation or depression no T wave inversion no sign of STEMI. [JM]   0933 CT Chest Pulmonary Embolism  FINDINGS:   Chest images at mediastinal window show multiple collaterals over the right chest wall suggesting right upper extremity venoocclusive disease. No pulmonary artery filling defects are seen. CT angiographic images also show no evidence of emboli. No acute  changes in the aorta. No adenopathy or effusions.     Chest images at lung window show no acute infiltrates or suspicious masses.     IMPRESSION:  No evidence of pulmonary embolism. Prominent collateral veins over the right chest wall suggest possible right upper extremity venoocclusive disease chronically. [AH]   0992 I offered a venous Doppler of the right upper extremity to the patient due to the findings on the CT scan.  She has declined at this time.  She states that she is  ready to go home and declines any further testing.  She states she will follow-up outpatient with her primary care provider as well as her cardiologist in [place].  She states she has an upcoming follow-up appointment to find out her echocardiogram results.  I also offered referral to surgery to possibly biopsy the enlarged lymph nodes that she has had for quite some time.  She declines that as well.  She states she will just follow-up with her family doctor.  I will start her on some antibiotics as she may possibly have some right lower extremity cellulitis.  She was strongly encouraged to follow-up at the next available appointment and to return to the ED at any time if anything changes. [AH]      ED Course User Index  [AH] Ioana Martinez PA  [JM] Cole Wylie MD  [[place]] Regis Mac MD  [MB] Lizet Nagy APRN                                               HEART Score (for prediction of 6-week risk of major adverse cardiac event) reviewed and/or performed as part of the patient evaluation and treatment planning process.  The result associated with this review/performance is: 3       MDM  Number of Diagnoses or Management Options     Amount and/or Complexity of Data Reviewed  Clinical lab tests: reviewed  Tests in the radiology section of CPT®: reviewed  Tests in the medicine section of CPT®: reviewed    Patient Progress  Patient progress: stable      Final diagnoses:   Cellulitis of right leg   Lymphadenopathy   Chronic chest pain       ED Disposition  ED Disposition     ED Disposition Condition Comment    Discharge Stable           DayElsa APRN  [address] [id number]  [phone]    Schedule an appointment as soon as possible for a visit in 2 days           Where to Get Your Medications      You can get these medications from any pharmacy    Bring a paper prescription for each of these medications  · doxycycline 100 MG capsule        Medication List      No changes were made to  your prescriptions during this visit.          Ioana Martinez PA  02/19/22 0949

## 2022-02-21 LAB
QT INTERVAL: 380 MS
QT INTERVAL: 420 MS
QTC INTERVAL: 443 MS
QTC INTERVAL: 459 MS

## 2022-02-24 LAB
BACTERIA SPEC AEROBE CULT: NORMAL
BACTERIA SPEC AEROBE CULT: NORMAL

## 2023-01-24 ENCOUNTER — APPOINTMENT (OUTPATIENT)
Dept: GENERAL RADIOLOGY | Facility: HOSPITAL | Age: 48
End: 2023-01-24
Payer: MEDICARE

## 2023-01-24 ENCOUNTER — APPOINTMENT (OUTPATIENT)
Dept: CT IMAGING | Facility: HOSPITAL | Age: 48
End: 2023-01-24
Payer: MEDICARE

## 2023-01-24 ENCOUNTER — HOSPITAL ENCOUNTER (EMERGENCY)
Facility: HOSPITAL | Age: 48
Discharge: LEFT BEFORE DISCHARGE | End: 2023-01-24
Attending: EMERGENCY MEDICINE | Admitting: EMERGENCY MEDICINE
Payer: MEDICARE

## 2023-01-24 VITALS
DIASTOLIC BLOOD PRESSURE: 72 MMHG | OXYGEN SATURATION: 96 % | BODY MASS INDEX: 51.91 KG/M2 | SYSTOLIC BLOOD PRESSURE: 109 MMHG | RESPIRATION RATE: 26 BRPM | HEART RATE: 73 BPM | HEIGHT: 63 IN | WEIGHT: 293 LBS | TEMPERATURE: 97.5 F

## 2023-01-24 DIAGNOSIS — R53.1 GENERALIZED WEAKNESS: ICD-10-CM

## 2023-01-24 DIAGNOSIS — N17.9 ACUTE RENAL FAILURE, UNSPECIFIED ACUTE RENAL FAILURE TYPE: Primary | ICD-10-CM

## 2023-01-24 DIAGNOSIS — E87.29 RESPIRATORY ACIDOSIS: ICD-10-CM

## 2023-01-24 DIAGNOSIS — E87.20 METABOLIC ACIDOSIS: ICD-10-CM

## 2023-01-24 DIAGNOSIS — F19.20 POLYSUBSTANCE DEPENDENCE: ICD-10-CM

## 2023-01-24 LAB
A-A DO2: 28.7 MMHG (ref 0–300)
A-A DO2: 53.3 MMHG (ref 0–300)
A-A DO2: 67.5 MMHG (ref 0–300)
A-A DO2: 69.8 MMHG (ref 0–300)
A-A DO2: 83.7 MMHG (ref 0–300)
ALBUMIN SERPL-MCNC: 3.6 G/DL (ref 3.5–5.2)
ALBUMIN/GLOB SERPL: 1 G/DL
ALP SERPL-CCNC: 189 U/L (ref 39–117)
ALT SERPL W P-5'-P-CCNC: 17 U/L (ref 1–33)
AMPHET+METHAMPHET UR QL: NEGATIVE
AMPHETAMINES UR QL: NEGATIVE
ANION GAP SERPL CALCULATED.3IONS-SCNC: 11.9 MMOL/L (ref 5–15)
ARTERIAL PATENCY WRIST A: POSITIVE
AST SERPL-CCNC: 28 U/L (ref 1–32)
ATMOSPHERIC PRESS: 730 MMHG
ATMOSPHERIC PRESS: 731 MMHG
ATMOSPHERIC PRESS: 733 MMHG
BARBITURATES UR QL SCN: NEGATIVE
BASE EXCESS BLDA CALC-SCNC: -2.8 MMOL/L (ref 0–2)
BASE EXCESS BLDA CALC-SCNC: -3.8 MMOL/L (ref 0–2)
BASE EXCESS BLDA CALC-SCNC: -3.8 MMOL/L (ref 0–2)
BASE EXCESS BLDA CALC-SCNC: -4.5 MMOL/L (ref 0–2)
BASE EXCESS BLDA CALC-SCNC: -6.7 MMOL/L (ref 0–2)
BASOPHILS # BLD AUTO: 0.04 10*3/MM3 (ref 0–0.2)
BASOPHILS NFR BLD AUTO: 0.4 % (ref 0–1.5)
BDY SITE: ABNORMAL
BENZODIAZ UR QL SCN: POSITIVE
BILIRUB SERPL-MCNC: 0.2 MG/DL (ref 0–1.2)
BILIRUB UR QL STRIP: NEGATIVE
BODY TEMPERATURE: 0 C
BUN SERPL-MCNC: 34 MG/DL (ref 6–20)
BUN/CREAT SERPL: 12.5 (ref 7–25)
BUPRENORPHINE SERPL-MCNC: POSITIVE NG/ML
CALCIUM SPEC-SCNC: 8.8 MG/DL (ref 8.6–10.5)
CANNABINOIDS SERPL QL: NEGATIVE
CHLORIDE SERPL-SCNC: 99 MMOL/L (ref 98–107)
CLARITY UR: CLEAR
CO2 BLDA-SCNC: 23.3 MMOL/L (ref 22–33)
CO2 BLDA-SCNC: 25.2 MMOL/L (ref 22–33)
CO2 BLDA-SCNC: 25.4 MMOL/L (ref 22–33)
CO2 BLDA-SCNC: 25.7 MMOL/L (ref 22–33)
CO2 BLDA-SCNC: 26.3 MMOL/L (ref 22–33)
CO2 SERPL-SCNC: 23.1 MMOL/L (ref 22–29)
COCAINE UR QL: NEGATIVE
COHGB MFR BLD: 1.5 % (ref 0–5)
COHGB MFR BLD: 1.6 % (ref 0–5)
COHGB MFR BLD: 1.9 % (ref 0–5)
COHGB MFR BLD: 2 % (ref 0–5)
COHGB MFR BLD: 2.1 % (ref 0–5)
COLOR UR: YELLOW
CREAT SERPL-MCNC: 2.72 MG/DL (ref 0.57–1)
CRP SERPL-MCNC: 1.49 MG/DL (ref 0–0.5)
D-LACTATE SERPL-SCNC: 1.2 MMOL/L (ref 0.5–2)
DEPRECATED RDW RBC AUTO: 47 FL (ref 37–54)
EGFRCR SERPLBLD CKD-EPI 2021: 21.1 ML/MIN/1.73
EOSINOPHIL # BLD AUTO: 0.12 10*3/MM3 (ref 0–0.4)
EOSINOPHIL NFR BLD AUTO: 1.2 % (ref 0.3–6.2)
EPAP: 8
EPAP: 8
ERYTHROCYTE [DISTWIDTH] IN BLOOD BY AUTOMATED COUNT: 14.6 % (ref 12.3–15.4)
ERYTHROCYTE [SEDIMENTATION RATE] IN BLOOD: 42 MM/HR (ref 0–20)
FLUAV RNA RESP QL NAA+PROBE: NOT DETECTED
FLUBV RNA RESP QL NAA+PROBE: NOT DETECTED
GAS FLOW AIRWAY: 2 LPM
GAS FLOW AIRWAY: 2 LPM
GLOBULIN UR ELPH-MCNC: 3.5 GM/DL
GLUCOSE SERPL-MCNC: 135 MG/DL (ref 65–99)
GLUCOSE UR STRIP-MCNC: ABNORMAL MG/DL
HCO3 BLDA-SCNC: 21.6 MMOL/L (ref 20–26)
HCO3 BLDA-SCNC: 23.5 MMOL/L (ref 20–26)
HCO3 BLDA-SCNC: 23.8 MMOL/L (ref 20–26)
HCO3 BLDA-SCNC: 24 MMOL/L (ref 20–26)
HCO3 BLDA-SCNC: 24.6 MMOL/L (ref 20–26)
HCT VFR BLD AUTO: 34.9 % (ref 34–46.6)
HCT VFR BLD CALC: 33 % (ref 38–51)
HCT VFR BLD CALC: 34.7 % (ref 38–51)
HCT VFR BLD CALC: 34.9 % (ref 38–51)
HCT VFR BLD CALC: 35.7 % (ref 38–51)
HCT VFR BLD CALC: 35.8 % (ref 38–51)
HGB BLD-MCNC: 11.3 G/DL (ref 12–15.9)
HGB BLDA-MCNC: 10.8 G/DL (ref 13.5–17.5)
HGB BLDA-MCNC: 11.3 G/DL (ref 13.5–17.5)
HGB BLDA-MCNC: 11.4 G/DL (ref 13.5–17.5)
HGB BLDA-MCNC: 11.7 G/DL (ref 13.5–17.5)
HGB BLDA-MCNC: 11.7 G/DL (ref 13.5–17.5)
HGB UR QL STRIP.AUTO: NEGATIVE
HOLD SPECIMEN: NORMAL
HOLD SPECIMEN: NORMAL
IMM GRANULOCYTES # BLD AUTO: 0.04 10*3/MM3 (ref 0–0.05)
IMM GRANULOCYTES NFR BLD AUTO: 0.4 % (ref 0–0.5)
INHALED O2 CONCENTRATION: 21 %
INHALED O2 CONCENTRATION: 28 %
INHALED O2 CONCENTRATION: 28 %
INHALED O2 CONCENTRATION: 30 %
INHALED O2 CONCENTRATION: 30 %
IPAP: 24
IPAP: 24
KETONES UR QL STRIP: NEGATIVE
LEUKOCYTE ESTERASE UR QL STRIP.AUTO: NEGATIVE
LYMPHOCYTES # BLD AUTO: 2.38 10*3/MM3 (ref 0.7–3.1)
LYMPHOCYTES NFR BLD AUTO: 24.6 % (ref 19.6–45.3)
Lab: ABNORMAL
MCH RBC QN AUTO: 28.3 PG (ref 26.6–33)
MCHC RBC AUTO-ENTMCNC: 32.4 G/DL (ref 31.5–35.7)
MCV RBC AUTO: 87.3 FL (ref 79–97)
METHADONE UR QL SCN: NEGATIVE
METHGB BLD QL: 0 % (ref 0–3)
METHGB BLD QL: 0.1 % (ref 0–3)
METHGB BLD QL: 0.2 % (ref 0–3)
METHGB BLD QL: 0.2 % (ref 0–3)
METHGB BLD QL: 0.3 % (ref 0–3)
MODALITY: ABNORMAL
MONOCYTES # BLD AUTO: 0.47 10*3/MM3 (ref 0.1–0.9)
MONOCYTES NFR BLD AUTO: 4.9 % (ref 5–12)
NEUTROPHILS NFR BLD AUTO: 6.64 10*3/MM3 (ref 1.7–7)
NEUTROPHILS NFR BLD AUTO: 68.5 % (ref 42.7–76)
NITRITE UR QL STRIP: NEGATIVE
NOTE: ABNORMAL
NOTIFIED BY: ABNORMAL
NOTIFIED WHO: ABNORMAL
NRBC BLD AUTO-RTO: 0 /100 WBC (ref 0–0.2)
NT-PROBNP SERPL-MCNC: <36 PG/ML (ref 0–450)
OPIATES UR QL: NEGATIVE
OXYCODONE UR QL SCN: NEGATIVE
OXYHGB MFR BLDV: 83.9 % (ref 94–99)
OXYHGB MFR BLDV: 86.1 % (ref 94–99)
OXYHGB MFR BLDV: 87.5 % (ref 94–99)
OXYHGB MFR BLDV: 87.6 % (ref 94–99)
OXYHGB MFR BLDV: 94.8 % (ref 94–99)
PCO2 BLDA: 54 MM HG (ref 35–45)
PCO2 BLDA: 54.1 MM HG (ref 35–45)
PCO2 BLDA: 55 MM HG (ref 35–45)
PCO2 BLDA: 55.5 MM HG (ref 35–45)
PCO2 BLDA: 55.6 MM HG (ref 35–45)
PCO2 TEMP ADJ BLD: ABNORMAL MM[HG]
PCP UR QL SCN: NEGATIVE
PH BLDA: 7.2 PH UNITS (ref 7.35–7.45)
PH BLDA: 7.23 PH UNITS (ref 7.35–7.45)
PH BLDA: 7.24 PH UNITS (ref 7.35–7.45)
PH BLDA: 7.25 PH UNITS (ref 7.35–7.45)
PH BLDA: 7.27 PH UNITS (ref 7.35–7.45)
PH UR STRIP.AUTO: 5.5 [PH] (ref 5–8)
PH, TEMP CORRECTED: ABNORMAL
PLATELET # BLD AUTO: 264 10*3/MM3 (ref 140–450)
PMV BLD AUTO: 10 FL (ref 6–12)
PO2 BLDA: 54.5 MM HG (ref 83–108)
PO2 BLDA: 59.8 MM HG (ref 83–108)
PO2 BLDA: 61.2 MM HG (ref 83–108)
PO2 BLDA: 62.4 MM HG (ref 83–108)
PO2 BLDA: 89.7 MM HG (ref 83–108)
PO2 TEMP ADJ BLD: ABNORMAL MM[HG]
POTASSIUM SERPL-SCNC: 4.3 MMOL/L (ref 3.5–5.2)
PROPOXYPH UR QL: NEGATIVE
PROT SERPL-MCNC: 7.1 G/DL (ref 6–8.5)
PROT UR QL STRIP: NEGATIVE
RBC # BLD AUTO: 4 10*6/MM3 (ref 3.77–5.28)
SAO2 % BLDCOA: 85.9 % (ref 94–99)
SAO2 % BLDCOA: 88.1 % (ref 94–99)
SAO2 % BLDCOA: 88.8 % (ref 94–99)
SAO2 % BLDCOA: 89.4 % (ref 94–99)
SAO2 % BLDCOA: 96.4 % (ref 94–99)
SARS-COV-2 RNA RESP QL NAA+PROBE: NOT DETECTED
SET MECH RESP RATE: 24
SET MECH RESP RATE: 26
SODIUM SERPL-SCNC: 134 MMOL/L (ref 136–145)
SP GR UR STRIP: 1.01 (ref 1–1.03)
TRICYCLICS UR QL SCN: NEGATIVE
UROBILINOGEN UR QL STRIP: ABNORMAL
VENTILATOR MODE: ABNORMAL
WBC NRBC COR # BLD: 9.69 10*3/MM3 (ref 3.4–10.8)
WHOLE BLOOD HOLD COAG: NORMAL
WHOLE BLOOD HOLD SPECIMEN: NORMAL

## 2023-01-24 PROCEDURE — 81003 URINALYSIS AUTO W/O SCOPE: CPT | Performed by: PHYSICIAN ASSISTANT

## 2023-01-24 PROCEDURE — 99284 EMERGENCY DEPT VISIT MOD MDM: CPT

## 2023-01-24 PROCEDURE — 94660 CPAP INITIATION&MGMT: CPT

## 2023-01-24 PROCEDURE — 87040 BLOOD CULTURE FOR BACTERIA: CPT | Performed by: PHYSICIAN ASSISTANT

## 2023-01-24 PROCEDURE — 94799 UNLISTED PULMONARY SVC/PX: CPT

## 2023-01-24 PROCEDURE — 71045 X-RAY EXAM CHEST 1 VIEW: CPT

## 2023-01-24 PROCEDURE — 87636 SARSCOV2 & INF A&B AMP PRB: CPT | Performed by: PHYSICIAN ASSISTANT

## 2023-01-24 PROCEDURE — 83880 ASSAY OF NATRIURETIC PEPTIDE: CPT | Performed by: PHYSICIAN ASSISTANT

## 2023-01-24 PROCEDURE — 82805 BLOOD GASES W/O2 SATURATION: CPT

## 2023-01-24 PROCEDURE — 83605 ASSAY OF LACTIC ACID: CPT | Performed by: PHYSICIAN ASSISTANT

## 2023-01-24 PROCEDURE — 93010 ELECTROCARDIOGRAM REPORT: CPT | Performed by: INTERNAL MEDICINE

## 2023-01-24 PROCEDURE — 85652 RBC SED RATE AUTOMATED: CPT | Performed by: PHYSICIAN ASSISTANT

## 2023-01-24 PROCEDURE — 94640 AIRWAY INHALATION TREATMENT: CPT

## 2023-01-24 PROCEDURE — 25010000002 ORPHENADRINE CITRATE PER 60 MG: Performed by: PHYSICIAN ASSISTANT

## 2023-01-24 PROCEDURE — 70450 CT HEAD/BRAIN W/O DYE: CPT

## 2023-01-24 PROCEDURE — 83050 HGB METHEMOGLOBIN QUAN: CPT

## 2023-01-24 PROCEDURE — 25010000002 METOCLOPRAMIDE PER 10 MG: Performed by: PHYSICIAN ASSISTANT

## 2023-01-24 PROCEDURE — 96374 THER/PROPH/DIAG INJ IV PUSH: CPT

## 2023-01-24 PROCEDURE — 80306 DRUG TEST PRSMV INSTRMNT: CPT | Performed by: PHYSICIAN ASSISTANT

## 2023-01-24 PROCEDURE — 25010000002 LORAZEPAM PER 2 MG: Performed by: EMERGENCY MEDICINE

## 2023-01-24 PROCEDURE — 70450 CT HEAD/BRAIN W/O DYE: CPT | Performed by: RADIOLOGY

## 2023-01-24 PROCEDURE — 93005 ELECTROCARDIOGRAM TRACING: CPT | Performed by: PHYSICIAN ASSISTANT

## 2023-01-24 PROCEDURE — 36600 WITHDRAWAL OF ARTERIAL BLOOD: CPT

## 2023-01-24 PROCEDURE — 80053 COMPREHEN METABOLIC PANEL: CPT | Performed by: PHYSICIAN ASSISTANT

## 2023-01-24 PROCEDURE — 72125 CT NECK SPINE W/O DYE: CPT | Performed by: RADIOLOGY

## 2023-01-24 PROCEDURE — 25010000002 DEXAMETHASONE SODIUM PHOSPHATE 10 MG/ML SOLUTION: Performed by: PHYSICIAN ASSISTANT

## 2023-01-24 PROCEDURE — 85025 COMPLETE CBC W/AUTO DIFF WBC: CPT | Performed by: PHYSICIAN ASSISTANT

## 2023-01-24 PROCEDURE — 86140 C-REACTIVE PROTEIN: CPT | Performed by: PHYSICIAN ASSISTANT

## 2023-01-24 PROCEDURE — 36415 COLL VENOUS BLD VENIPUNCTURE: CPT

## 2023-01-24 PROCEDURE — 71045 X-RAY EXAM CHEST 1 VIEW: CPT | Performed by: RADIOLOGY

## 2023-01-24 PROCEDURE — 96375 TX/PRO/DX INJ NEW DRUG ADDON: CPT

## 2023-01-24 PROCEDURE — 72125 CT NECK SPINE W/O DYE: CPT

## 2023-01-24 PROCEDURE — 82375 ASSAY CARBOXYHB QUANT: CPT

## 2023-01-24 RX ORDER — METOCLOPRAMIDE HYDROCHLORIDE 5 MG/ML
10 INJECTION INTRAMUSCULAR; INTRAVENOUS ONCE
Status: COMPLETED | OUTPATIENT
Start: 2023-01-24 | End: 2023-01-24

## 2023-01-24 RX ORDER — DEXAMETHASONE SODIUM PHOSPHATE 10 MG/ML
10 INJECTION, SOLUTION INTRAMUSCULAR; INTRAVENOUS ONCE
Status: COMPLETED | OUTPATIENT
Start: 2023-01-24 | End: 2023-01-24

## 2023-01-24 RX ORDER — SODIUM CHLORIDE 0.9 % (FLUSH) 0.9 %
10 SYRINGE (ML) INJECTION AS NEEDED
Status: DISCONTINUED | OUTPATIENT
Start: 2023-01-24 | End: 2023-01-24 | Stop reason: HOSPADM

## 2023-01-24 RX ORDER — LORAZEPAM 2 MG/ML
1 INJECTION INTRAMUSCULAR ONCE
Status: COMPLETED | OUTPATIENT
Start: 2023-01-24 | End: 2023-01-24

## 2023-01-24 RX ORDER — ORPHENADRINE CITRATE 30 MG/ML
30 INJECTION INTRAMUSCULAR; INTRAVENOUS ONCE
Status: COMPLETED | OUTPATIENT
Start: 2023-01-24 | End: 2023-01-24

## 2023-01-24 RX ORDER — IPRATROPIUM BROMIDE AND ALBUTEROL SULFATE 2.5; .5 MG/3ML; MG/3ML
3 SOLUTION RESPIRATORY (INHALATION) AS NEEDED
Status: COMPLETED | OUTPATIENT
Start: 2023-01-24 | End: 2023-01-24

## 2023-01-24 RX ADMIN — SODIUM CHLORIDE 1000 ML: 9 INJECTION, SOLUTION INTRAVENOUS at 16:10

## 2023-01-24 RX ADMIN — ORPHENADRINE CITRATE 30 MG: 30 INJECTION INTRAMUSCULAR; INTRAVENOUS at 15:11

## 2023-01-24 RX ADMIN — IPRATROPIUM BROMIDE AND ALBUTEROL SULFATE 3 ML: 2.5; .5 SOLUTION RESPIRATORY (INHALATION) at 12:37

## 2023-01-24 RX ADMIN — DEXAMETHASONE SODIUM PHOSPHATE 10 MG: 10 INJECTION INTRAMUSCULAR; INTRAVENOUS at 12:53

## 2023-01-24 RX ADMIN — LORAZEPAM 1 MG: 2 INJECTION INTRAMUSCULAR; INTRAVENOUS at 15:11

## 2023-01-24 RX ADMIN — IPRATROPIUM BROMIDE AND ALBUTEROL SULFATE 3 ML: 2.5; .5 SOLUTION RESPIRATORY (INHALATION) at 12:11

## 2023-01-24 RX ADMIN — SODIUM CHLORIDE 1000 ML: 9 INJECTION, SOLUTION INTRAVENOUS at 13:12

## 2023-01-24 RX ADMIN — METOCLOPRAMIDE 10 MG: 5 INJECTION, SOLUTION INTRAMUSCULAR; INTRAVENOUS at 12:54

## 2023-01-24 NOTE — ED NOTES
Went to put pt on bipap and she does not want to wear it. She stated she wanted to leave. Told Shani and pt left ama.

## 2023-01-24 NOTE — ED PROVIDER NOTES
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  Patient states that she feels like every muscle in her body is in a \"huge cramp\".  She denies shortness of breath, chest pain, or abdominal pain. Aggravating factors include movement. Denies any alleviating factors despite home medications. Denies any other complaints or concerns at this time.    History provided by:  Patient   used: No        Review of Systems   Constitutional: Negative for fever.   Respiratory: Negative.    Cardiovascular: Negative.  Negative for chest pain.   Gastrointestinal: Negative.  Negative for abdominal pain.   Endocrine: Negative.    Genitourinary: Negative.  Negative for dysuria.   Musculoskeletal: Positive for myalgias and neck pain. Negative for back pain.   Skin: Negative.    Neurological: Positive for weakness and headaches.   Psychiatric/Behavioral: Negative.    All other systems reviewed and are negative.      Past Medical History:   Diagnosis Date   • Anxiety disorder 2020   • Arthritis    • CHF (congestive heart failure) (CMS/[facility])    • COPD (chronic obstructive pulmonary disease) (CMS/[facility])    • Diabetes mellitus (CMS/[facility])    • GERD (gastroesophageal reflux disease)    • Hyperlipidemia    • Hypertension    • Morbid obesity (CMS/[facility]) 2020   • Pseudotumor cerebri    • Restless leg    • Sleep apnea        Allergies   Allergen Reactions   • Toradol [Ketorolac Tromethamine] Other (See Comments)     Pt states she does not remember reaction but told to not take it   • Erythromycin Rash       Past Surgical History:   Procedure Laterality Date   •  SECTION     • CHOLECYSTECTOMY     • TONSILLECTOMY     • " TUBAL ABDOMINAL LIGATION         Family History   Problem Relation Age of Onset   • Diabetes Mother    • Hypertension Mother    • Stroke Father    • Hypertension Father    • Diabetes Father    • Cancer Paternal Grandmother         breat and liver        Social History     Socioeconomic History   • Marital status: Legally    Tobacco Use   • Smoking status: Every Day     Packs/day: 1.00     Types: Cigarettes, Electronic Cigarette   • Smokeless tobacco: Never   Substance and Sexual Activity   • Alcohol use: No   • Drug use: No   • Sexual activity: Defer           Objective   Physical Exam  Vitals and nursing note reviewed.   Constitutional:       General: She is not in acute distress.     Appearance: She is well-developed. She is morbidly obese. She is not diaphoretic.   HENT:      Head: Normocephalic and atraumatic.      Right Ear: External ear normal.      Left Ear: External ear normal.      Nose: Nose normal.   Eyes:      Conjunctiva/sclera: Conjunctivae normal.      Pupils: Pupils are equal, round, and reactive to light.   Neck:      Vascular: No JVD.      Trachea: No tracheal deviation.   Cardiovascular:      Rate and Rhythm: Normal rate and regular rhythm.      Heart sounds: Normal heart sounds. No murmur heard.  Pulmonary:      Effort: Pulmonary effort is normal. No respiratory distress.      Breath sounds: Normal breath sounds. No wheezing.   Abdominal:      General: Bowel sounds are normal.      Palpations: Abdomen is soft.      Tenderness: There is no abdominal tenderness.   Musculoskeletal:         General: No deformity. Normal range of motion.      Cervical back: Normal range of motion and neck supple. Spasms present. Pain with movement present.      Thoracic back: Normal.      Lumbar back: Normal.   Skin:     General: Skin is warm and dry.      Coloration: Skin is not pale.      Findings: No erythema or rash.   Neurological:      Mental Status: She is alert and oriented to person, place, and time.       Cranial Nerves: No cranial nerve deficit.   Psychiatric:         Behavior: Behavior normal.         Thought Content: Thought content normal.         Procedures           ED Course  ED Course as of 01/24/23 2041 Tue Jan 24, 2023   1208 Pt with acidotic ABG with pH 7.267, CO2 54, and PO2 54.5 on RA. Will place on 2L oxygen and have RT administer 2 duoneb treatments, then repeat gas. If no better, will place on BIPAP. Pt previous ABG similar to today's however was on 2L oxygen previously and wasn't as acidotic as she is today. [TK]   1245 ECG 12 Lead Other; hypoxia  Normal sinus rhythm.  Rate 78.  Normal axis.  Normal QT interval.  No acute ischemic changes.  Normal EKG.  Interpreted by me.  Electronically signed by Noe Francisco MD, 01/24/23, 12:46 PM EST.   [BC]   1324 Pt to be placed on BIPAP, after 2 duonebs, steroids, and 2L oxygen CO2 increased and pH decreased. [TK]   1334 BIPAP settings 24/8 RR 24 FiO2 30% [TK]   1334 CT Head Without Contrast [TK]   1337 CT Cervical Spine Without Contrast [TK]   1337 XR Chest 1 View [TK]   1354 Pt took BIPAP off and states she just wants to eat and if she can't eat she's going to leave AMA. Pt informed that she'd have to leave AMA and chances of death are highly likely considering she is in respiratory and renal failure.  [TK]   1428 ABG remains similar, RT to increase RR to 26. [TK]   1429 proBNP: <36.0 [TK]   1452 Pt has removed BIPAP and states she can no longer handle it. [TK]   1540 Discussed patient with Dr. Corona regarding ABG and pt appears to be in more metabolic acidosis vs respiratory. Will leave current BIPAP settings and consult with nephrology regarding renal failure. [TK]   1559 Spoke with Dr. Escobedo, nephrologist recommends IVF and will see patient in consult. [TK]   1605 Attending aware that I need hospitalist for admission. [TK]   1611 Currently no beds at our facility. Will attempt to transfer patient to outside facility. [TK]   1622 Pt has removed  "BIPAP and states she can no longer wear it. Placed on 2L NC. [TK]   1630 Spoke with Dr. Cesar at [facility] who has accepted the patient, contingent upon Nephrology recommendations at their facility. [TK]   1717 Access center called back with Dr. Elena and he got disconnected before I got to the phone. I have personally called him back and his voice mail says \"Please do not leave messages on this phone\". Will try again [TK]   1741 Spoke with Dr. Elena, recommends nothing further. Does request PCU bed at [facility]. [TK]   1810 pH, Arterial(!!): 7.203  Repeat ABG after pt took BIPAP off appears to be declining, will attempt to place pt back on BIPAP. [TK]   1820 Patient wants to leave AMA. She has been advised of the risks of leaving and is willing to proceed. She is A&O x 4, acknowledges and verbalizes understanding.   [TK]   1821 Discussed patient with Dr. Corona prior to AMA. [TK]      ED Course User Index  [BC] Noe Francisco MD  [TK] Ashly Garcia PA-C           Results for orders placed or performed during the hospital encounter of 01/24/23   COVID-19 and FLU A/B PCR - Swab, Nasopharynx    Specimen: Nasopharynx; Swab   Result Value Ref Range    COVID19 Not Detected Not Detected - Ref. Range    Influenza A PCR Not Detected Not Detected    Influenza B PCR Not Detected Not Detected   Comprehensive Metabolic Panel    Specimen: Blood   Result Value Ref Range    Glucose 135 (H) 65 - 99 mg/dL    BUN 34 (H) 6 - 20 mg/dL    Creatinine 2.72 (H) 0.57 - 1.00 mg/dL    Sodium 134 (L) 136 - 145 mmol/L    Potassium 4.3 3.5 - 5.2 mmol/L    Chloride 99 98 - 107 mmol/L    CO2 23.1 22.0 - 29.0 mmol/L    Calcium 8.8 8.6 - 10.5 mg/dL    Total Protein 7.1 6.0 - 8.5 g/dL    Albumin 3.6 3.5 - 5.2 g/dL    ALT (SGPT) 17 1 - 33 U/L    AST (SGOT) 28 1 - 32 U/L    Alkaline Phosphatase 189 (H) 39 - 117 U/L    Total Bilirubin 0.2 0.0 - 1.2 mg/dL    Globulin 3.5 gm/dL    A/G Ratio 1.0 g/dL    BUN/Creatinine Ratio 12.5 7.0 - " 25.0    Anion Gap 11.9 5.0 - 15.0 mmol/L    eGFR 21.1 (L) >60.0 mL/min/1.73   C-reactive Protein    Specimen: Blood   Result Value Ref Range    C-Reactive Protein 1.49 (H) 0.00 - 0.50 mg/dL   Urinalysis With Microscopic If Indicated (No Culture) - Urine, Clean Catch    Specimen: Urine, Clean Catch   Result Value Ref Range    Color, UA Yellow Yellow, Straw    Appearance, UA Clear Clear    pH, UA 5.5 5.0 - 8.0    Specific Gravity, UA 1.011 1.005 - 1.030    Glucose,  mg/dL (2+) (A) Negative    Ketones, UA Negative Negative    Bilirubin, UA Negative Negative    Blood, UA Negative Negative    Protein, UA Negative Negative    Leuk Esterase, UA Negative Negative    Nitrite, UA Negative Negative    Urobilinogen, UA 0.2 E.U./dL 0.2 - 1.0 E.U./dL   CBC Auto Differential    Specimen: Arm, Left; Blood   Result Value Ref Range    WBC 9.69 3.40 - 10.80 10*3/mm3    RBC 4.00 3.77 - 5.28 10*6/mm3    Hemoglobin 11.3 (L) 12.0 - 15.9 g/dL    Hematocrit 34.9 34.0 - 46.6 %    MCV 87.3 79.0 - 97.0 fL    MCH 28.3 26.6 - 33.0 pg    MCHC 32.4 31.5 - 35.7 g/dL    RDW 14.6 12.3 - 15.4 %    RDW-SD 47.0 37.0 - 54.0 fl    MPV 10.0 6.0 - 12.0 fL    Platelets 264 140 - 450 10*3/mm3    Neutrophil % 68.5 42.7 - 76.0 %    Lymphocyte % 24.6 19.6 - 45.3 %    Monocyte % 4.9 (L) 5.0 - 12.0 %    Eosinophil % 1.2 0.3 - 6.2 %    Basophil % 0.4 0.0 - 1.5 %    Immature Grans % 0.4 0.0 - 0.5 %    Neutrophils, Absolute 6.64 1.70 - 7.00 10*3/mm3    Lymphocytes, Absolute 2.38 0.70 - 3.10 10*3/mm3    Monocytes, Absolute 0.47 0.10 - 0.90 10*3/mm3    Eosinophils, Absolute 0.12 0.00 - 0.40 10*3/mm3    Basophils, Absolute 0.04 0.00 - 0.20 10*3/mm3    Immature Grans, Absolute 0.04 0.00 - 0.05 10*3/mm3    nRBC 0.0 0.0 - 0.2 /100 WBC   Sedimentation Rate    Specimen: Arm, Left; Blood   Result Value Ref Range    Sed Rate 42 (H) 0 - 20 mm/hr   Lactic Acid, Plasma    Specimen: Blood   Result Value Ref Range    Lactate 1.2 0.5 - 2.0 mmol/L   Blood Gas, Arterial With  Co-Ox    Specimen: Arterial Blood   Result Value Ref Range    Site Left Radial     Khai's Test Positive     pH, Arterial 7.267 (L) 7.350 - 7.450 pH units    pCO2, Arterial 54.0 (H) 35.0 - 45.0 mm Hg    pO2, Arterial 54.5 (C) 83.0 - 108.0 mm Hg    HCO3, Arterial 24.6 20.0 - 26.0 mmol/L    Base Excess, Arterial -2.8 (L) 0.0 - 2.0 mmol/L    O2 Saturation, Arterial 85.9 (L) 94.0 - 99.0 %    Hemoglobin, Blood Gas 11.4 (L) 13.5 - 17.5 g/dL    Hematocrit, Blood Gas 34.9 (L) 38.0 - 51.0 %    Oxyhemoglobin 83.9 (L) 94 - 99 %    Methemoglobin 0.20 0.00 - 3.00 %    Carboxyhemoglobin 2.1 0 - 5 %    A-a DO2 28.7 0.0 - 300.0 mmHg    CO2 Content 26.3 22 - 33 mmol/L    Temperature 0.0 C    Barometric Pressure for Blood Gas 733 mmHg    Modality Room Air     FIO2 21 %    Ventilator Mode NA     Note      Notified Who ER PA AND RN     Notified By [id number]     Notified Time 01/24/2023 12:09     Collected by [id number]     pH, Temp Corrected      pCO2, Temperature Corrected      pO2, Temperature Corrected     Urine Drug Screen - Urine, Clean Catch    Specimen: Urine, Clean Catch   Result Value Ref Range    THC, Screen, Urine Negative Negative    Phencyclidine (PCP), Urine Negative Negative    Cocaine Screen, Urine Negative Negative    Methamphetamine, Ur Negative Negative    Opiate Screen Negative Negative    Amphetamine Screen, Urine Negative Negative    Benzodiazepine Screen, Urine Positive (A) Negative    Tricyclic Antidepressants Screen Negative Negative    Methadone Screen, Urine Negative Negative    Barbiturates Screen, Urine Negative Negative    Oxycodone Screen, Urine Negative Negative    Propoxyphene Screen Negative Negative    Buprenorphine, Screen, Urine Positive (A) Negative   Blood Gas, Arterial With Co-Ox    Specimen: Arterial Blood   Result Value Ref Range    Site Left Radial     Khai's Test Positive     pH, Arterial 7.244 (L) 7.350 - 7.450 pH units    pCO2, Arterial 55.5 (H) 35.0 - 45.0 mm Hg    pO2, Arterial 59.8 (L) 83.0 -  108.0 mm Hg    HCO3, Arterial 24.0 20.0 - 26.0 mmol/L    Base Excess, Arterial -3.8 (L) 0.0 - 2.0 mmol/L    O2 Saturation, Arterial 88.1 (L) 94.0 - 99.0 %    Hemoglobin, Blood Gas 11.3 (L) 13.5 - 17.5 g/dL    Hematocrit, Blood Gas 34.7 (L) 38.0 - 51.0 %    Oxyhemoglobin 86.1 (L) 94 - 99 %    Methemoglobin 0.30 0.00 - 3.00 %    Carboxyhemoglobin 2.0 0 - 5 %    A-a DO2 69.8 0.0 - 300.0 mmHg    CO2 Content 25.7 22 - 33 mmol/L    Temperature 0.0 C    Barometric Pressure for Blood Gas 731 mmHg    Modality Nasal Cannula     FIO2 28 %    Flow Rate 2.0 lpm    Ventilator Mode NA     Note      Collected by [id number]     pH, Temp Corrected      pCO2, Temperature Corrected      pO2, Temperature Corrected     BNP    Specimen: Blood   Result Value Ref Range    proBNP <36.0 0.0 - 450.0 pg/mL   Blood Gas, Arterial With Co-Ox    Specimen: Arterial Blood   Result Value Ref Range    Site Left Radial     Khai's Test Positive     pH, Arterial 7.251 (L) 7.350 - 7.450 pH units    pCO2, Arterial 54.1 (H) 35.0 - 45.0 mm Hg    pO2, Arterial 61.2 (L) 83.0 - 108.0 mm Hg    HCO3, Arterial 23.8 20.0 - 26.0 mmol/L    Base Excess, Arterial -3.8 (L) 0.0 - 2.0 mmol/L    O2 Saturation, Arterial 89.4 (L) 94.0 - 99.0 %    Hemoglobin, Blood Gas 10.8 (L) 13.5 - 17.5 g/dL    Hematocrit, Blood Gas 33.0 (L) 38.0 - 51.0 %    Oxyhemoglobin 87.5 (L) 94 - 99 %    Methemoglobin 0.20 0.00 - 3.00 %    Carboxyhemoglobin 1.9 0 - 5 %    A-a DO2 83.7 0.0 - 300.0 mmHg    CO2 Content 25.4 22 - 33 mmol/L    Temperature 0.0 C    Barometric Pressure for Blood Gas 731 mmHg    Modality BiPap     FIO2 30 %    Ventilator Mode NA     Set Mech Resp Rate 24.0     IPAP 24     EPAP 8     Note      Collected by [id number]     pH, Temp Corrected      pCO2, Temperature Corrected      pO2, Temperature Corrected     Blood Gas, Arterial With Co-Ox    Specimen: Arterial Blood   Result Value Ref Range    Site Left Radial     Khai's Test Positive     pH, Arterial 7.234 (L) 7.350 - 7.450 pH units     pCO2, Arterial 55.6 (H) 35.0 - 45.0 mm Hg    pO2, Arterial 89.7 83.0 - 108.0 mm Hg    HCO3, Arterial 23.5 20.0 - 26.0 mmol/L    Base Excess, Arterial -4.5 (L) 0.0 - 2.0 mmol/L    O2 Saturation, Arterial 96.4 94.0 - 99.0 %    Hemoglobin, Blood Gas 11.7 (L) 13.5 - 17.5 g/dL    Hematocrit, Blood Gas 35.7 (L) 38.0 - 51.0 %    Oxyhemoglobin 94.8 94 - 99 %    Methemoglobin 0.10 0.00 - 3.00 %    Carboxyhemoglobin 1.6 0 - 5 %    A-a DO2 53.3 0.0 - 300.0 mmHg    CO2 Content 25.2 22 - 33 mmol/L    Temperature 0.0 C    Barometric Pressure for Blood Gas 730 mmHg    Modality Room Air     FIO2 30 %    Ventilator Mode NA     Set Mech Resp Rate 26.0     IPAP 24     EPAP 8     Note      Notified Who RN AND MD     Notified By [id number]     Collected by [id number]     pH, Temp Corrected      pCO2, Temperature Corrected      pO2, Temperature Corrected     Blood Gas, Arterial With Co-Ox    Specimen: Arterial Blood   Result Value Ref Range    Site Left Radial     Khai's Test Positive     pH, Arterial 7.203 (C) 7.350 - 7.450 pH units    pCO2, Arterial 55.0 (H) 35.0 - 45.0 mm Hg    pO2, Arterial 62.4 (L) 83.0 - 108.0 mm Hg    HCO3, Arterial 21.6 20.0 - 26.0 mmol/L    Base Excess, Arterial -6.7 (L) 0.0 - 2.0 mmol/L    O2 Saturation, Arterial 88.8 (L) 94.0 - 99.0 %    Hemoglobin, Blood Gas 11.7 (L) 13.5 - 17.5 g/dL    Hematocrit, Blood Gas 35.8 (L) 38.0 - 51.0 %    Oxyhemoglobin 87.6 (L) 94 - 99 %    Methemoglobin 0.00 0.00 - 3.00 %    Carboxyhemoglobin 1.5 0 - 5 %    A-a DO2 67.5 0.0 - 300.0 mmHg    CO2 Content 23.3 22 - 33 mmol/L    Temperature 0.0 C    Barometric Pressure for Blood Gas 731 mmHg    Modality Nasal Cannula     FIO2 28 %    Flow Rate 2.0 lpm    Ventilator Mode NA     Note      Notified Who LEENA LIM     Notified By [id number]     Notified Time 01/24/2023 18:03     Collected by [id number]     pH, Temp Corrected      pCO2, Temperature Corrected      pO2, Temperature Corrected     ECG 12 Lead Other; hypoxia   Result Value Ref Range     "QT Interval 390 ms    QTC Interval 444 ms   Green Top (Gel)   Result Value Ref Range    Extra Tube Hold for add-ons.    Lavender Top   Result Value Ref Range    Extra Tube hold for add-on    Gold Top - SST   Result Value Ref Range    Extra Tube Hold for add-ons.    Light Blue Top   Result Value Ref Range    Extra Tube Hold for add-ons.        XR Chest 1 View   Final Result   Mild pulmonary congestion       This report was finalized on 1/24/2023 1:22 PM by Dr. Mata Pendleton MD.          CT Head Without Contrast   Final Result     Unremarkable exam demonstrating no CT evidence of acute intracranial   findings.       This report was finalized on 1/24/2023 1:12 PM by Dr. Mata Pendleton MD.          CT Cervical Spine Without Contrast   Final Result       1. No acute bony abnormality.       2. Other incidental findings as above       This report was finalized on 1/24/2023 1:12 PM by Dr. Mata Pendleton MD.                                            Medical Decision Making  47-year-old female with past medical history of sleep apnea, restless leg syndrome, pseudotumor cerebri, hypertension, hyperlipidemia, GERD, diabetes, oxygen dependent COPD on 4 L as needed, CHF, arthritis, anxiety, and morbid obesity presents to the emergency room with generalized weakness all over x 4 days.  Patient states that she feels like every muscle in her body is in a \"huge cramp\".  She denies shortness of breath, chest pain, or abdominal pain. Aggravating factors include movement. Denies any alleviating factors despite home medications. Denies any other complaints or concerns at this time.    Pt found to have acute respiratory failure requiring BIPAP and acute renal failure. Pt needing hospital admission, however no beds at our facility got pt accepted to [facility] but before patient could get bed assignment she left AM.    Acute renal failure, unspecified acute renal failure type (HCC): acute illness or injury  Generalized weakness: " acute illness or injury  Metabolic acidosis: acute illness or injury  Polysubstance dependence (HCC): acute illness or injury  Respiratory acidosis: acute illness or injury  Amount and/or Complexity of Data Reviewed  External Data Reviewed: notes.     Details: Records from [facility] in December 2022 reviewed via chart review.  Labs: ordered. Decision-making details documented in ED Course.  Radiology: ordered. Decision-making details documented in ED Course.  ECG/medicine tests: ordered. Decision-making details documented in ED Course.      Risk  Prescription drug management.          Final diagnoses:   Acute renal failure, unspecified acute renal failure type (HCC)   Polysubstance dependence (HCC)   Metabolic acidosis   Generalized weakness   Respiratory acidosis       ED Disposition  ED Disposition     ED Disposition   AMA    Condition   --    Comment   --             No follow-up provider specified.       Medication List      No changes were made to your prescriptions during this visit.         Ashly Garcia PA-C  01/24/23 2041

## 2023-01-25 LAB
QT INTERVAL: 390 MS
QTC INTERVAL: 444 MS

## 2023-01-29 LAB
BACTERIA SPEC AEROBE CULT: NORMAL
BACTERIA SPEC AEROBE CULT: NORMAL

## 2023-12-28 ENCOUNTER — APPOINTMENT (OUTPATIENT)
Dept: CT IMAGING | Facility: HOSPITAL | Age: 48
End: 2023-12-28
Payer: MEDICARE

## 2023-12-28 ENCOUNTER — HOSPITAL ENCOUNTER (EMERGENCY)
Facility: HOSPITAL | Age: 48
Discharge: HOME | End: 2023-12-28
Attending: STUDENT IN AN ORGANIZED HEALTH CARE EDUCATION/TRAINING PROGRAM | Admitting: STUDENT IN AN ORGANIZED HEALTH CARE EDUCATION/TRAINING PROGRAM
Payer: MEDICARE

## 2023-12-28 VITALS
BODY MASS INDEX: 51.91 KG/M2 | TEMPERATURE: 98.5 F | DIASTOLIC BLOOD PRESSURE: 60 MMHG | HEART RATE: 59 BPM | RESPIRATION RATE: 17 BRPM | WEIGHT: 293 LBS | OXYGEN SATURATION: 95 % | HEIGHT: 63 IN | SYSTOLIC BLOOD PRESSURE: 116 MMHG

## 2023-12-28 DIAGNOSIS — R10.12 LUQ PAIN: Primary | ICD-10-CM

## 2023-12-28 LAB
ALBUMIN SERPL-MCNC: 3.4 G/DL (ref 3.5–5.2)
ALBUMIN/GLOB SERPL: 1.2 G/DL
ALP SERPL-CCNC: 170 U/L (ref 39–117)
ALT SERPL W P-5'-P-CCNC: 9 U/L (ref 1–33)
ANION GAP SERPL CALCULATED.3IONS-SCNC: 9.9 MMOL/L (ref 5–15)
AST SERPL-CCNC: 11 U/L (ref 1–32)
BASOPHILS # BLD AUTO: 0.03 10*3/MM3 (ref 0–0.2)
BASOPHILS NFR BLD AUTO: 0.4 % (ref 0–1.5)
BILIRUB SERPL-MCNC: 0.3 MG/DL (ref 0–1.2)
BILIRUB UR QL STRIP: NEGATIVE
BUN SERPL-MCNC: 10 MG/DL (ref 6–20)
BUN/CREAT SERPL: 11.5 (ref 7–25)
CALCIUM SPEC-SCNC: 8.1 MG/DL (ref 8.6–10.5)
CHLORIDE SERPL-SCNC: 103 MMOL/L (ref 98–107)
CLARITY UR: CLEAR
CO2 SERPL-SCNC: 29.1 MMOL/L (ref 22–29)
COLOR UR: YELLOW
CREAT SERPL-MCNC: 0.87 MG/DL (ref 0.57–1)
CRP SERPL-MCNC: <0.3 MG/DL (ref 0–0.5)
DEPRECATED RDW RBC AUTO: 43.9 FL (ref 37–54)
EGFRCR SERPLBLD CKD-EPI 2021: 82.3 ML/MIN/1.73
EOSINOPHIL # BLD AUTO: 0.14 10*3/MM3 (ref 0–0.4)
EOSINOPHIL NFR BLD AUTO: 1.9 % (ref 0.3–6.2)
ERYTHROCYTE [DISTWIDTH] IN BLOOD BY AUTOMATED COUNT: 13.6 % (ref 12.3–15.4)
ERYTHROCYTE [SEDIMENTATION RATE] IN BLOOD: 16 MM/HR (ref 0–20)
GLOBULIN UR ELPH-MCNC: 2.8 GM/DL
GLUCOSE SERPL-MCNC: 190 MG/DL (ref 65–99)
GLUCOSE UR STRIP-MCNC: NEGATIVE MG/DL
HCG SERPL QL: NEGATIVE
HCT VFR BLD AUTO: 36.3 % (ref 34–46.6)
HGB BLD-MCNC: 11.4 G/DL (ref 12–15.9)
HGB UR QL STRIP.AUTO: NEGATIVE
HOLD SPECIMEN: NORMAL
IMM GRANULOCYTES # BLD AUTO: 0.05 10*3/MM3 (ref 0–0.05)
IMM GRANULOCYTES NFR BLD AUTO: 0.7 % (ref 0–0.5)
KETONES UR QL STRIP: NEGATIVE
LEUKOCYTE ESTERASE UR QL STRIP.AUTO: NEGATIVE
LIPASE SERPL-CCNC: 8 U/L (ref 13–60)
LYMPHOCYTES # BLD AUTO: 2.14 10*3/MM3 (ref 0.7–3.1)
LYMPHOCYTES NFR BLD AUTO: 28.8 % (ref 19.6–45.3)
MCH RBC QN AUTO: 27.9 PG (ref 26.6–33)
MCHC RBC AUTO-ENTMCNC: 31.4 G/DL (ref 31.5–35.7)
MCV RBC AUTO: 88.8 FL (ref 79–97)
MONOCYTES # BLD AUTO: 0.31 10*3/MM3 (ref 0.1–0.9)
MONOCYTES NFR BLD AUTO: 4.2 % (ref 5–12)
NEUTROPHILS NFR BLD AUTO: 4.77 10*3/MM3 (ref 1.7–7)
NEUTROPHILS NFR BLD AUTO: 64 % (ref 42.7–76)
NITRITE UR QL STRIP: NEGATIVE
NRBC BLD AUTO-RTO: 0 /100 WBC (ref 0–0.2)
PH UR STRIP.AUTO: 7 [PH] (ref 5–8)
PLATELET # BLD AUTO: 225 10*3/MM3 (ref 140–450)
PMV BLD AUTO: 10.5 FL (ref 6–12)
POTASSIUM SERPL-SCNC: 4.3 MMOL/L (ref 3.5–5.2)
PROT SERPL-MCNC: 6.2 G/DL (ref 6–8.5)
PROT UR QL STRIP: NEGATIVE
RBC # BLD AUTO: 4.09 10*6/MM3 (ref 3.77–5.28)
SODIUM SERPL-SCNC: 142 MMOL/L (ref 136–145)
SP GR UR STRIP: 1.02 (ref 1–1.03)
UROBILINOGEN UR QL STRIP: NORMAL
WBC NRBC COR # BLD AUTO: 7.44 10*3/MM3 (ref 3.4–10.8)
WHOLE BLOOD HOLD COAG: NORMAL
WHOLE BLOOD HOLD SPECIMEN: NORMAL

## 2023-12-28 PROCEDURE — 86140 C-REACTIVE PROTEIN: CPT | Performed by: PHYSICIAN ASSISTANT

## 2023-12-28 PROCEDURE — 81003 URINALYSIS AUTO W/O SCOPE: CPT | Performed by: PHYSICIAN ASSISTANT

## 2023-12-28 PROCEDURE — 74175 CTA ABDOMEN W/CONTRAST: CPT

## 2023-12-28 PROCEDURE — 84703 CHORIONIC GONADOTROPIN ASSAY: CPT | Performed by: PHYSICIAN ASSISTANT

## 2023-12-28 PROCEDURE — 99285 EMERGENCY DEPT VISIT HI MDM: CPT

## 2023-12-28 PROCEDURE — 85652 RBC SED RATE AUTOMATED: CPT | Performed by: PHYSICIAN ASSISTANT

## 2023-12-28 PROCEDURE — 74175 CTA ABDOMEN W/CONTRAST: CPT | Performed by: RADIOLOGY

## 2023-12-28 PROCEDURE — 85025 COMPLETE CBC W/AUTO DIFF WBC: CPT | Performed by: PHYSICIAN ASSISTANT

## 2023-12-28 PROCEDURE — 25510000001 IOPAMIDOL PER 1 ML: Performed by: STUDENT IN AN ORGANIZED HEALTH CARE EDUCATION/TRAINING PROGRAM

## 2023-12-28 PROCEDURE — 83690 ASSAY OF LIPASE: CPT | Performed by: PHYSICIAN ASSISTANT

## 2023-12-28 PROCEDURE — 80053 COMPREHEN METABOLIC PANEL: CPT | Performed by: PHYSICIAN ASSISTANT

## 2023-12-28 RX ORDER — SODIUM CHLORIDE 0.9 % (FLUSH) 0.9 %
10 SYRINGE (ML) INJECTION AS NEEDED
Status: DISCONTINUED | OUTPATIENT
Start: 2023-12-28 | End: 2023-12-28 | Stop reason: HOSPADM

## 2023-12-28 RX ORDER — PANTOPRAZOLE SODIUM 40 MG/1
40 TABLET, DELAYED RELEASE ORAL DAILY
Qty: 30 TABLET | Refills: 0 | Status: SHIPPED | OUTPATIENT
Start: 2023-12-28

## 2023-12-28 RX ORDER — SUCRALFATE 1 G/1
1 TABLET ORAL
Qty: 30 TABLET | Refills: 0 | Status: SHIPPED | OUTPATIENT
Start: 2023-12-28

## 2023-12-28 RX ADMIN — IOPAMIDOL 100 ML: 755 INJECTION, SOLUTION INTRAVENOUS at 12:53

## 2023-12-28 NOTE — ED PROVIDER NOTES
Subjective   History of Present Illness  48-year-old female presents secondary to left upper quadrant abdominal pain.  Patient states this is been present on and off for over a week.  She has been seen in [place] twice.  I reviewed her CTs there.  She has some nausea and has had 1 episode of vomiting.  No diarrhea.  She denies any fever.  No falls or injuries.  She states her bowel movements are normal.  Her last normal bowel movement was this morning.  No blood.  She denies any burning with urination urinary frequency or urgency.  She denies any weight loss.  No other complaints this time.  She has a past medical history of hypertension, congestive heart failure, COPD, diabetes, hyperlipidemia and acid reflux.  She presents by private vehicle.        Review of Systems   Constitutional: Negative.  Negative for fever.   HENT: Negative.     Respiratory: Negative.     Cardiovascular: Negative.  Negative for chest pain.   Gastrointestinal: Negative.  Negative for abdominal pain.   Endocrine: Negative.    Genitourinary: Negative.  Negative for dysuria.   Skin: Negative.    Neurological: Negative.    Psychiatric/Behavioral: Negative.     All other systems reviewed and are negative.      Past Medical History:   Diagnosis Date    Anxiety disorder 2020    Arthritis     CHF (congestive heart failure)     COPD (chronic obstructive pulmonary disease)     Diabetes mellitus     GERD (gastroesophageal reflux disease)     Hyperlipidemia     Hypertension     Morbid obesity 2020    Pseudotumor cerebri     Restless leg     Sleep apnea        Allergies   Allergen Reactions    Toradol [Ketorolac Tromethamine] Other (See Comments)     Pt states she does not remember reaction but told to not take it    Erythromycin Rash       Past Surgical History:   Procedure Laterality Date     SECTION      CHOLECYSTECTOMY      TONSILLECTOMY      TUBAL ABDOMINAL LIGATION         Family History   Problem Relation Age of Onset    Diabetes  Mother     Hypertension Mother     Stroke Father     Hypertension Father     Diabetes Father     Cancer Paternal Grandmother         breat and liver        Social History     Socioeconomic History    Marital status: Legally    Tobacco Use    Smoking status: Every Day     Packs/day: 1     Types: Cigarettes, Electronic Cigarette    Smokeless tobacco: Never   Substance and Sexual Activity    Alcohol use: No    Drug use: No    Sexual activity: Defer           Objective   Physical Exam  Vitals and nursing note reviewed.   Constitutional:       General: She is not in acute distress.     Appearance: She is well-developed. She is not diaphoretic.   HENT:      Head: Normocephalic and atraumatic.      Right Ear: External ear normal.      Left Ear: External ear normal.      Nose: Nose normal.   Eyes:      Conjunctiva/sclera: Conjunctivae normal.      Pupils: Pupils are equal, round, and reactive to light.   Neck:      Vascular: No JVD.      Trachea: No tracheal deviation.   Cardiovascular:      Rate and Rhythm: Normal rate and regular rhythm.      Heart sounds: Normal heart sounds. No murmur heard.  Pulmonary:      Effort: Pulmonary effort is normal. No respiratory distress.      Breath sounds: Normal breath sounds. No wheezing.   Abdominal:      General: Bowel sounds are normal.      Palpations: Abdomen is soft.      Tenderness: There is no abdominal tenderness. There is no guarding or rebound.   Musculoskeletal:         General: No deformity. Normal range of motion.      Cervical back: Normal range of motion and neck supple.   Skin:     General: Skin is warm and dry.      Coloration: Skin is not pale.      Findings: No erythema or rash.   Neurological:      Mental Status: She is alert and oriented to person, place, and time.      Cranial Nerves: No cranial nerve deficit.   Psychiatric:         Behavior: Behavior normal.         Thought Content: Thought content normal.         Procedures           ED Course                                  Results for orders placed or performed during the hospital encounter of 12/28/23   Comprehensive Metabolic Panel    Specimen: Blood   Result Value Ref Range    Glucose 190 (H) 65 - 99 mg/dL    BUN 10 6 - 20 mg/dL    Creatinine 0.87 0.57 - 1.00 mg/dL    Sodium 142 136 - 145 mmol/L    Potassium 4.3 3.5 - 5.2 mmol/L    Chloride 103 98 - 107 mmol/L    CO2 29.1 (H) 22.0 - 29.0 mmol/L    Calcium 8.1 (L) 8.6 - 10.5 mg/dL    Total Protein 6.2 6.0 - 8.5 g/dL    Albumin 3.4 (L) 3.5 - 5.2 g/dL    ALT (SGPT) 9 1 - 33 U/L    AST (SGOT) 11 1 - 32 U/L    Alkaline Phosphatase 170 (H) 39 - 117 U/L    Total Bilirubin 0.3 0.0 - 1.2 mg/dL    Globulin 2.8 gm/dL    A/G Ratio 1.2 g/dL    BUN/Creatinine Ratio 11.5 7.0 - 25.0    Anion Gap 9.9 5.0 - 15.0 mmol/L    eGFR 82.3 >60.0 mL/min/1.73   Lipase    Specimen: Blood   Result Value Ref Range    Lipase 8 (L) 13 - 60 U/L   hCG, Serum, Qualitative    Specimen: Blood   Result Value Ref Range    HCG Qualitative Negative Negative   Urinalysis With Culture If Indicated - Urine, Clean Catch    Specimen: Urine, Clean Catch   Result Value Ref Range    Color, UA Yellow Yellow, Straw    Appearance, UA Clear Clear    pH, UA 7.0 5.0 - 8.0    Specific Gravity, UA 1.017 1.005 - 1.030    Glucose, UA Negative Negative    Ketones, UA Negative Negative    Bilirubin, UA Negative Negative    Blood, UA Negative Negative    Protein, UA Negative Negative    Leuk Esterase, UA Negative Negative    Nitrite, UA Negative Negative    Urobilinogen, UA 1.0 E.U./dL 0.2 - 1.0 E.U./dL   C-reactive Protein    Specimen: Blood   Result Value Ref Range    C-Reactive Protein <0.30 0.00 - 0.50 mg/dL   Sedimentation Rate    Specimen: Blood   Result Value Ref Range    Sed Rate 16 0 - 20 mm/hr   CBC Auto Differential    Specimen: Blood   Result Value Ref Range    WBC 7.44 3.40 - 10.80 10*3/mm3    RBC 4.09 3.77 - 5.28 10*6/mm3    Hemoglobin 11.4 (L) 12.0 - 15.9 g/dL    Hematocrit 36.3 34.0 - 46.6 %    MCV 88.8  79.0 - 97.0 fL    MCH 27.9 26.6 - 33.0 pg    MCHC 31.4 (L) 31.5 - 35.7 g/dL    RDW 13.6 12.3 - 15.4 %    RDW-SD 43.9 37.0 - 54.0 fl    MPV 10.5 6.0 - 12.0 fL    Platelets 225 140 - 450 10*3/mm3    Neutrophil % 64.0 42.7 - 76.0 %    Lymphocyte % 28.8 19.6 - 45.3 %    Monocyte % 4.2 (L) 5.0 - 12.0 %    Eosinophil % 1.9 0.3 - 6.2 %    Basophil % 0.4 0.0 - 1.5 %    Immature Grans % 0.7 (H) 0.0 - 0.5 %    Neutrophils, Absolute 4.77 1.70 - 7.00 10*3/mm3    Lymphocytes, Absolute 2.14 0.70 - 3.10 10*3/mm3    Monocytes, Absolute 0.31 0.10 - 0.90 10*3/mm3    Eosinophils, Absolute 0.14 0.00 - 0.40 10*3/mm3    Basophils, Absolute 0.03 0.00 - 0.20 10*3/mm3    Immature Grans, Absolute 0.05 0.00 - 0.05 10*3/mm3    nRBC 0.0 0.0 - 0.2 /100 WBC   Green Top (Gel)   Result Value Ref Range    Extra Tube Hold for add-ons.    Lavender Top   Result Value Ref Range    Extra Tube hold for add-on    Light Blue Top   Result Value Ref Range    Extra Tube Hold for add-ons.      CT Angiogram Aorta    Result Date: 12/28/2023  No evidence of acute aortic injury on today's exam .    This report was finalized on 12/28/2023 1:13 PM by Dr. Mata Pendleton MD.                 Medical Decision Making  48-year-old female presents secondary to left upper quadrant abdominal pain.  Patient states this is been present on and off for over a week.  She has been seen in [place] twice.  I reviewed her CTs there.  She has some nausea and has had 1 episode of vomiting.  No diarrhea.  She denies any fever.  No falls or injuries.  She states her bowel movements are normal.  Her last normal bowel movement was this morning.  No blood.  She denies any burning with urination urinary frequency or urgency.  She denies any weight loss.  No other complaints this time.  She has a past medical history of hypertension, congestive heart failure, COPD, diabetes, hyperlipidemia and acid reflux.  She presents by private vehicle.    Problems Addressed:  LUQ pain: complicated acute  illness or injury    Amount and/or Complexity of Data Reviewed  Labs: ordered. Decision-making details documented in ED Course.  Radiology: ordered. Decision-making details documented in ED Course.    Risk  Prescription drug management.  Risk Details: Patient was counseled out the need for further evaluation and treatment.  Her CT was normal.  I reviewed her previous CTs from [place].  This showed some lymphadenopathy which was felt to be potentially secondary to her fatty liver disease.  Patient had a benign abdomen.  She will be placed on Protonix and Carafate.  She was given the number for surgery for further evaluation and treatment.  She voices understanding.        Final diagnoses:   LUQ pain       ED Disposition  ED Disposition       ED Disposition   Discharge    Condition   Stable    Comment   --               Day, Elsa Wilkins, APRN  [id number]  [address]  [facility] [address]  [phone]    Schedule an appointment as soon as possible for a visit       Rolando Birmingham MD  [address] [place] [address] [address]  [employer] [place] [address]  [phone]    Schedule an appointment as soon as possible for a visit            Medication List        New Prescriptions      pantoprazole 40 MG EC tablet  Commonly known as: PROTONIX  Take 1 tablet by mouth Daily.     sucralfate 1 g tablet  Commonly known as: CARAFATE  Take 1 tablet by mouth 3 (Three) Times a Day Before Meals.               Where to Get Your Medications        You can get these medications from any pharmacy    Bring a paper prescription for each of these medications  pantoprazole 40 MG EC tablet  sucralfate 1 g tablet            Braeden Ulrich PA  12/28/23 [id number]

## 2024-07-29 NOTE — ED NOTES
"Pt's refuses COVID-19 swab at this time. Pt states \"my family doctor is setting all of that up, I don't want to be swabbed here and swabbed with my family doctor.\" explained to pt her doctor would be able to get results. Pt continues to refuse COVID-19 swab. MD made aware     Sruthi Spivey, RN  08/05/20 0651    "
Gave patient a bed side potty.      Aye Huizar  08/05/20 0829    
Warm

## 2024-08-15 ENCOUNTER — HOSPITAL ENCOUNTER (OUTPATIENT)
Dept: RESPIRATORY THERAPY | Facility: HOSPITAL | Age: 49
Discharge: HOME | End: 2024-08-15
Payer: MEDICARE

## 2024-08-15 ENCOUNTER — TRANSCRIBE ORDERS (OUTPATIENT)
Dept: ADMINISTRATIVE | Facility: HOSPITAL | Age: 49
End: 2024-08-15
Payer: MEDICARE

## 2024-08-15 DIAGNOSIS — J96.01 ACUTE RESPIRATORY FAILURE WITH HYPOXIA: ICD-10-CM

## 2024-08-15 DIAGNOSIS — J96.01 ACUTE RESPIRATORY FAILURE WITH HYPOXIA: Primary | ICD-10-CM

## 2024-08-15 LAB
A-A DO2: 30.4 MMHG (ref 0–300)
ARTERIAL PATENCY WRIST A: POSITIVE
ATMOSPHERIC PRESS: 728 MMHG
BASE EXCESS BLDA CALC-SCNC: 0.3 MMOL/L (ref 0–2)
BDY SITE: ABNORMAL
CO2 BLDA-SCNC: 28.1 MMOL/L (ref 22–33)
COHGB MFR BLD: 4.3 % (ref 0–5)
HCO3 BLDA-SCNC: 26.5 MMOL/L (ref 20–26)
HCT VFR BLD CALC: 32.9 % (ref 38–51)
HGB BLDA-MCNC: 10.7 G/DL (ref 13.5–17.5)
INHALED O2 CONCENTRATION: 21 %
Lab: ABNORMAL
METHGB BLD QL: 0.3 % (ref 0–3)
MODALITY: ABNORMAL
OXYHGB MFR BLDV: 85.5 % (ref 94–99)
PCO2 BLDA: 49.4 MM HG (ref 35–45)
PCO2 TEMP ADJ BLD: ABNORMAL MM[HG]
PH BLDA: 7.34 PH UNITS (ref 7.35–7.45)
PH, TEMP CORRECTED: ABNORMAL
PO2 BLDA: 56.8 MM HG (ref 83–108)
PO2 TEMP ADJ BLD: ABNORMAL MM[HG]
SAO2 % BLDCOA: 89.6 % (ref 94–99)
VENTILATOR MODE: ABNORMAL

## 2024-08-15 PROCEDURE — 82805 BLOOD GASES W/O2 SATURATION: CPT

## 2024-08-15 PROCEDURE — 36600 WITHDRAWAL OF ARTERIAL BLOOD: CPT

## 2024-08-15 PROCEDURE — 83050 HGB METHEMOGLOBIN QUAN: CPT

## 2024-08-15 PROCEDURE — 82375 ASSAY CARBOXYHB QUANT: CPT

## 2024-12-26 ENCOUNTER — TRANSCRIBE ORDERS (OUTPATIENT)
Dept: ADMINISTRATIVE | Facility: HOSPITAL | Age: 49
End: 2024-12-26
Payer: MEDICARE

## 2024-12-26 DIAGNOSIS — Z12.31 VISIT FOR SCREENING MAMMOGRAM: Primary | ICD-10-CM

## 2025-08-19 ENCOUNTER — TRANSCRIBE ORDERS (OUTPATIENT)
Dept: ADMINISTRATIVE | Facility: HOSPITAL | Age: 50
End: 2025-08-19
Payer: MEDICARE

## 2025-08-26 ENCOUNTER — TRANSCRIBE ORDERS (OUTPATIENT)
Dept: ADMINISTRATIVE | Facility: HOSPITAL | Age: 50
End: 2025-08-26
Payer: MEDICARE

## 2025-08-26 DIAGNOSIS — R22.40 MASS OF LOWER EXTREMITY, UNSPECIFIED LATERALITY: ICD-10-CM

## 2025-08-26 DIAGNOSIS — R19.09 GROIN MASS: ICD-10-CM

## 2025-08-26 DIAGNOSIS — R22.31 MASS OF RIGHT UPPER EXTREMITY: ICD-10-CM

## 2025-08-26 DIAGNOSIS — R22.31 LOCALIZED SWELLING, MASS, OR LUMP OF RIGHT UPPER EXTREMITY: ICD-10-CM
